# Patient Record
Sex: MALE | Race: WHITE | NOT HISPANIC OR LATINO | Employment: OTHER | URBAN - METROPOLITAN AREA
[De-identification: names, ages, dates, MRNs, and addresses within clinical notes are randomized per-mention and may not be internally consistent; named-entity substitution may affect disease eponyms.]

---

## 2019-11-01 ENCOUNTER — OFFICE VISIT (OUTPATIENT)
Dept: ORTHOPEDICS | Facility: CLINIC | Age: 39
End: 2019-11-01
Payer: COMMERCIAL

## 2019-11-01 ENCOUNTER — ANCILLARY PROCEDURE (OUTPATIENT)
Dept: GENERAL RADIOLOGY | Facility: CLINIC | Age: 39
End: 2019-11-01
Attending: FAMILY MEDICINE
Payer: COMMERCIAL

## 2019-11-01 VITALS — BODY MASS INDEX: 33.48 KG/M2 | DIASTOLIC BLOOD PRESSURE: 64 MMHG | SYSTOLIC BLOOD PRESSURE: 118 MMHG | WEIGHT: 252 LBS

## 2019-11-01 DIAGNOSIS — M25.812 SHOULDER IMPINGEMENT, LEFT: ICD-10-CM

## 2019-11-01 DIAGNOSIS — M25.512 ACUTE PAIN OF LEFT SHOULDER: Primary | ICD-10-CM

## 2019-11-01 DIAGNOSIS — M25.512 LEFT SHOULDER PAIN: ICD-10-CM

## 2019-11-01 PROCEDURE — 99204 OFFICE O/P NEW MOD 45 MIN: CPT | Performed by: FAMILY MEDICINE

## 2019-11-01 PROCEDURE — 73030 X-RAY EXAM OF SHOULDER: CPT | Mod: LT

## 2019-11-01 NOTE — PROGRESS NOTES
ASSESSMENT & PLAN  Mason was seen today for pain.    Diagnoses and all orders for this visit:    Acute pain of left shoulder  -     XR Shoulder Left G/E 3 Views; Future    Shoulder impingement, left      Patient is a 38 year old male presenting for evaluation of   Chief Complaint   Patient presents with     Left Shoulder - Pain      # Left Shoulder Rotator Cuff Tendinopathy/Impingement: Notable after a fall down stairs 2 weeks ago.  Pain notably over ant and lateral shoulder, no weakness on RTC testing.  No RTC tear on U/S.  XR neg for fracture.  Likely shoulder impingement in the setting of acute fall.  No radicular sx.  Counseled patient on nature of condition and treatment options.  Given this plan as below, follow-up as needed    Treatment: activities as tolerated  Physical Therapy HEP, if not improved can refer for formal PT  Injection Defer for now, can consider subacromial injection if pain does not improve  Medications  Limited NSAIDs/Tylenol    Concerning signs/sx that would warrant urgent evaluation were discussed.  All questions were answered, patient understands and agrees with plan.      Return if symptoms worsen or fail to improve.    -----    SUBJECTIVE  Masonbrock Sin is a/an 38 year old Right handed male who is seen as a self referral for evaluation of left shoulder pain. The patient is seen by themselves.    Onset: 2 week(s) ago. Patient describes injury as fell down stairs with acute pain immediately afterwards.  Pain improved can use with FROM.  Pt only has mild pain can have lingering pain afterwards.    Location of Pain: left anterior shoulder   Rating of Pain at worst: 1/10  Rating of Pain Currently: 0/10  Worsened by: reaching, abduction   Better with: rest   Treatments tried: no treatment tried to date  Associated symptoms: no distal numbness or tingling; denies swelling or warmth  Orthopedic history: NO  Relevant surgical history: NO  Past Medical History:   Diagnosis Date     NO ACTIVE  PROBLEMS      Social History     Socioeconomic History     Marital status: Single     Spouse name: Not on file     Number of children: Not on file     Years of education: Not on file     Highest education level: Not on file   Occupational History     Not on file   Social Needs     Financial resource strain: Not on file     Food insecurity:     Worry: Not on file     Inability: Not on file     Transportation needs:     Medical: Not on file     Non-medical: Not on file   Tobacco Use     Smoking status: Never Smoker     Smokeless tobacco: Never Used   Substance and Sexual Activity     Alcohol use: Yes     Comment: weekly     Drug use: No     Sexual activity: Yes     Partners: Female     Birth control/protection: Pill   Lifestyle     Physical activity:     Days per week: Not on file     Minutes per session: Not on file     Stress: Not on file   Relationships     Social connections:     Talks on phone: Not on file     Gets together: Not on file     Attends [employer] service: Not on file     Active member of club or organization: Not on file     Attends meetings of clubs or organizations: Not on file     Relationship status: Not on file     Intimate partner violence:     Fear of current or ex partner: Not on file     Emotionally abused: Not on file     Physically abused: Not on file     Forced sexual activity: Not on file   Other Topics Concern      Service No     Blood Transfusions No     Caffeine Concern No     Occupational Exposure No     Hobby Hazards Yes     Comment: Flying     Sleep Concern No     Stress Concern No     Weight Concern No     Special Diet No     Back Care No     Exercise Yes     Bike Helmet Yes     Seat Belt Yes     Self-Exams Yes     Parent/sibling w/ CABG, MI or angioplasty before 65F 55M? No   Social History Narrative     Not on file     Patient's past medical, surgical, social, and family histories were reviewed today and no changes are noted.  No family history pertinent to the patient's  problem today      REVIEW OF SYSTEMS:  10 point ROS is negative other than symptoms noted above in HPI, Past Medical History or as stated below  Constitutional: NEGATIVE for fever, chills, change in weight  Skin: NEGATIVE for worrisome rashes, moles or lesions  GI/: NEGATIVE for bowel or bladder changes  Neuro: NEGATIVE for weakness, dizziness or paresthesias    OBJECTIVE:  /64   Wt 114.3 kg (252 lb)   BMI 33.48 kg/m     General: healthy, alert and in no distress  HEENT: no scleral icterus or conjunctival erythema  Skin: no suspicious lesions or rash. No jaundice.  CV: regular rhythm by palpation  Resp: normal respiratory effort without conversational dyspnea   Psych: normal mood and affect  Gait: normal steady gait with appropriate coordination and balance  Neuro: normal light touch sensory exam of the bilateral upper extremities.    MSK:  LEFT SHOULDER  Inspection:    no swelling, bruising, discoloration, or obvious deformity or asymmetry  Palpation:    Tender about the greater tuberosity and supraspinatus insertion. Remainder of bony and tendinous landmarks are nontender.  Active Range of Motion:     Abduction 1800, FF 1800, , IR L1.    Strength:    Scapular plane abduction 5/5,  ER 5/5, IR 5/5, biceps 5/5, triceps 5/5  Special Tests:    Positive: Neer's, Frias' and [place]'s    Negative: supraspinatus (empty can), Speed's and Yergason's    CERVICAL SPINE  Inspection:    normal cervical lordosis present, rounded shoulders, forward head posture  Palpation:   Nontender.  Range of Motion:     Flexion full    Extension full    Right side bend full    Left side bend full    Right rotation full    Left rotation full  Strength:    Full strength throughout all neck muscles  Special Tests:    Positive: None    Negative: Spurling's (bilateral)    Independent visualization of the below image:  No results found for this or any previous visit (from the past 24 hour(s)).  LEFT SHOULDER THREE OR MORE VIEWS   11/1/2019 10:54 AM      HISTORY: Left shoulder pain.     COMPARISON: None.                                                                    IMPRESSION: No bony or soft tissue abnormality.     TANIKA LANDA MD  I  ordered, visualized and reviewed these images with the patient  No fracture     Patient's conditions were thoroughly discussed during today's visit with greater than 50% of the visit spent counseling the patient with total time spent face-to-face with the patient being 30 minutes.    Capo Diaz MD [facility]

## 2019-11-01 NOTE — PATIENT INSTRUCTIONS
Diagnosis: Left Shoulder pain following a fall likely strain of a muscle, shoulder impingement  Image Findings: Negative shoulder xr  Treatment: relative rest, home exercises  Job: no restrictions  Medications: Limited tylenol/ibuprofen for pain for 1-2 weeks  Follow-up: As needed    It was great seeing you today!    Capo Diaz

## 2020-02-10 ENCOUNTER — HEALTH MAINTENANCE LETTER (OUTPATIENT)
Age: 40
End: 2020-02-10

## 2020-11-16 ENCOUNTER — HEALTH MAINTENANCE LETTER (OUTPATIENT)
Age: 40
End: 2020-11-16

## 2021-04-03 ENCOUNTER — HEALTH MAINTENANCE LETTER (OUTPATIENT)
Age: 41
End: 2021-04-03

## 2021-09-18 ENCOUNTER — HEALTH MAINTENANCE LETTER (OUTPATIENT)
Age: 41
End: 2021-09-18

## 2021-11-08 ENCOUNTER — HOSPITAL ENCOUNTER (EMERGENCY)
Facility: CLINIC | Age: 41
Discharge: HOME | End: 2021-11-09
Attending: EMERGENCY MEDICINE | Admitting: EMERGENCY MEDICINE
Payer: COMMERCIAL

## 2021-11-08 DIAGNOSIS — Z72.820 SLEEP DEPRIVATION: ICD-10-CM

## 2021-11-08 DIAGNOSIS — F29 PSYCHOSIS, UNSPECIFIED PSYCHOSIS TYPE (H): ICD-10-CM

## 2021-11-08 DIAGNOSIS — F30.9 MANIA (H): ICD-10-CM

## 2021-11-08 PROCEDURE — 99285 EMERGENCY DEPT VISIT HI MDM: CPT | Performed by: EMERGENCY MEDICINE

## 2021-11-08 PROCEDURE — 99285 EMERGENCY DEPT VISIT HI MDM: CPT | Mod: 25 | Performed by: EMERGENCY MEDICINE

## 2021-11-08 PROCEDURE — C9803 HOPD COVID-19 SPEC COLLECT: HCPCS | Performed by: EMERGENCY MEDICINE

## 2021-11-08 ASSESSMENT — MIFFLIN-ST. JEOR: SCORE: 2112.4

## 2021-11-09 ENCOUNTER — APPOINTMENT (OUTPATIENT)
Dept: CT IMAGING | Facility: CLINIC | Age: 41
End: 2021-11-09
Attending: EMERGENCY MEDICINE
Payer: COMMERCIAL

## 2021-11-09 ENCOUNTER — TELEPHONE (OUTPATIENT)
Dept: BEHAVIORAL HEALTH | Facility: CLINIC | Age: 41
End: 2021-11-09

## 2021-11-09 VITALS
OXYGEN SATURATION: 97 % | TEMPERATURE: 97.9 F | BODY MASS INDEX: 33.41 KG/M2 | SYSTOLIC BLOOD PRESSURE: 131 MMHG | WEIGHT: 252.1 LBS | HEART RATE: 99 BPM | RESPIRATION RATE: 18 BRPM | DIASTOLIC BLOOD PRESSURE: 89 MMHG | HEIGHT: 73 IN

## 2021-11-09 LAB
ALBUMIN SERPL-MCNC: 3.9 G/DL (ref 3.4–5)
ALP SERPL-CCNC: 47 U/L (ref 40–150)
ALT SERPL W P-5'-P-CCNC: 26 U/L (ref 0–70)
ANION GAP SERPL CALCULATED.3IONS-SCNC: 6 MMOL/L (ref 3–14)
AST SERPL W P-5'-P-CCNC: 20 U/L (ref 0–45)
BASOPHILS # BLD AUTO: 0.1 10E3/UL (ref 0–0.2)
BASOPHILS NFR BLD AUTO: 1 %
BILIRUB SERPL-MCNC: 0.7 MG/DL (ref 0.2–1.3)
BUN SERPL-MCNC: 14 MG/DL (ref 7–30)
CALCIUM SERPL-MCNC: 8.9 MG/DL (ref 8.5–10.1)
CHLORIDE BLD-SCNC: 105 MMOL/L (ref 94–109)
CO2 SERPL-SCNC: 26 MMOL/L (ref 20–32)
CREAT SERPL-MCNC: 1.08 MG/DL (ref 0.66–1.25)
EOSINOPHIL # BLD AUTO: 0.6 10E3/UL (ref 0–0.7)
EOSINOPHIL NFR BLD AUTO: 8 %
ERYTHROCYTE [DISTWIDTH] IN BLOOD BY AUTOMATED COUNT: 12.3 % (ref 10–15)
GFR SERPL CREATININE-BSD FRML MDRD: 86 ML/MIN/1.73M2
GLUCOSE BLD-MCNC: 95 MG/DL (ref 70–99)
HCT VFR BLD AUTO: 48.2 % (ref 40–53)
HGB BLD-MCNC: 16.4 G/DL (ref 13.3–17.7)
IMM GRANULOCYTES # BLD: 0 10E3/UL
IMM GRANULOCYTES NFR BLD: 0 %
LYMPHOCYTES # BLD AUTO: 2.5 10E3/UL (ref 0.8–5.3)
LYMPHOCYTES NFR BLD AUTO: 34 %
MCH RBC QN AUTO: 28.9 PG (ref 26.5–33)
MCHC RBC AUTO-ENTMCNC: 34 G/DL (ref 31.5–36.5)
MCV RBC AUTO: 85 FL (ref 78–100)
MONOCYTES # BLD AUTO: 0.8 10E3/UL (ref 0–1.3)
MONOCYTES NFR BLD AUTO: 11 %
NEUTROPHILS # BLD AUTO: 3.5 10E3/UL (ref 1.6–8.3)
NEUTROPHILS NFR BLD AUTO: 46 %
NRBC # BLD AUTO: 0 10E3/UL
NRBC BLD AUTO-RTO: 0 /100
PLATELET # BLD AUTO: 293 10E3/UL (ref 150–450)
POTASSIUM BLD-SCNC: 4 MMOL/L (ref 3.4–5.3)
PROT SERPL-MCNC: 7.5 G/DL (ref 6.8–8.8)
RBC # BLD AUTO: 5.68 10E6/UL (ref 4.4–5.9)
SARS-COV-2 RNA RESP QL NAA+PROBE: NEGATIVE
SODIUM SERPL-SCNC: 137 MMOL/L (ref 133–144)
TSH SERPL DL<=0.005 MIU/L-ACNC: 1.37 MU/L (ref 0.4–4)
WBC # BLD AUTO: 7.4 10E3/UL (ref 4–11)

## 2021-11-09 PROCEDURE — U0005 INFEC AGEN DETEC AMPLI PROBE: HCPCS | Performed by: EMERGENCY MEDICINE

## 2021-11-09 PROCEDURE — 70450 CT HEAD/BRAIN W/O DYE: CPT

## 2021-11-09 PROCEDURE — 250N000013 HC RX MED GY IP 250 OP 250 PS 637: Performed by: EMERGENCY MEDICINE

## 2021-11-09 PROCEDURE — 84443 ASSAY THYROID STIM HORMONE: CPT | Performed by: EMERGENCY MEDICINE

## 2021-11-09 PROCEDURE — 82040 ASSAY OF SERUM ALBUMIN: CPT | Performed by: EMERGENCY MEDICINE

## 2021-11-09 PROCEDURE — 85025 COMPLETE CBC W/AUTO DIFF WBC: CPT | Performed by: EMERGENCY MEDICINE

## 2021-11-09 PROCEDURE — 36415 COLL VENOUS BLD VENIPUNCTURE: CPT | Performed by: EMERGENCY MEDICINE

## 2021-11-09 PROCEDURE — 90791 PSYCH DIAGNOSTIC EVALUATION: CPT

## 2021-11-09 RX ORDER — OLANZAPINE 5 MG/1
5 TABLET, ORALLY DISINTEGRATING ORAL ONCE
Status: COMPLETED | OUTPATIENT
Start: 2021-11-09 | End: 2021-11-09

## 2021-11-09 RX ADMIN — OLANZAPINE 5 MG: 5 TABLET, ORALLY DISINTEGRATING ORAL at 02:00

## 2021-11-09 NOTE — ED NOTES
Pt's wife Shanti called- wanting to know how he's doing. Glad he slept the night and did not want his nurse waking him up for her phone call.

## 2021-11-09 NOTE — ED PROVIDER NOTES
"ED Provider Note  [facility]      History     Chief Complaint   Patient presents with     Manic Behavior     reported has been having only few hours of sleep for 3-4 nights. Poor concentration and constanly talking for 72 hours.      The history is provided by the spouse.     Mason Sin is a 40 year old male who presents to the ED after recent manic episode.  Patient presents to the ED with his wife.  His wife reports that patient has not slept in the last 4 days.  In the last 72 hours, patient has been making no sense while talking and is been calling people he has not spoken to for a decade.  She denies that patient wishes to harm himself or others.  He states that patient has no mental health diagnosis and takes no mental health medications.  She states that this is the patient's first manic episode.  She states that patient has become paranoid about people disappearing at work and is worried about keeping the doors locked at their home.  He states that stressors include patient's job, wife's recent pregnancy, and 2-year-old child at home.  Patient's wife states that patient has not been exhibiting aggressive behavior, just has been talking nonstop.  She states that patient \"crashed\" today physically, patient was unable to move but he could still talk.  Patient fell asleep in the ED waiting room without the use of medication.  Patient's wife states that she is seeking prescription for sleep medication for the patient and to establish mental health care or therapy for the patient.  The DEC  states that patient has become alert, is calm, but is making odd comments, such as his \"need to go home to get on a rocket ship\". Pt additionally commenting about his great grandmother having a 6th sense. Denies any injuries or acute physical complaints at this time.     Past Medical History  Past Medical History:   Diagnosis Date     NO ACTIVE PROBLEMS      Past Surgical History: " "  Procedure Laterality Date     NO HISTORY OF SURGERY       valACYclovir (VALTREX) 1000 mg tablet  NO ACTIVE MEDICATIONS      No Known Allergies  Family History  History reviewed. No pertinent family history.  Social History   Social History     Tobacco Use     Smoking status: Never Smoker     Smokeless tobacco: Never Used   Substance Use Topics     Alcohol use: Yes     Comment: occ     Drug use: No      Past medical history, past surgical history, medications, allergies, family history, and social history were reviewed with the patient. No additional pertinent items.       Review of Systems   Unable to perform ROS: Mental status change     A complete review of systems was performed with pertinent positives and negatives noted in the HPI, and all other systems negative.    Physical Exam   BP: 118/86  Pulse: 74  Temp: 97.9  F (36.6  C)  Resp: 20  Height: 185.4 cm (6' 1\")  Weight: 114.4 kg (252 lb 1.6 oz)  SpO2: (!) 81 %  Physical Exam  Vitals and nursing note reviewed.   Constitutional:       General: He is not in acute distress.     Appearance: He is not diaphoretic.   HENT:      Head: Atraumatic.   Eyes:      General: No scleral icterus.  Cardiovascular:      Heart sounds: Normal heart sounds.   Pulmonary:      Effort: No respiratory distress.      Breath sounds: Normal breath sounds.   Abdominal:      Palpations: Abdomen is soft.      Tenderness: There is no abdominal tenderness.   Musculoskeletal:         General: No tenderness.   Skin:     General: Skin is warm.      Findings: No rash.   Neurological:      General: No focal deficit present.      Mental Status: He is oriented to person, place, and time.      Cranial Nerves: No cranial nerve deficit.      Sensory: No sensory deficit.      Motor: No weakness.      Coordination: Coordination normal.         ED Course     1:30 AM  The patient was seen and examined by Francy Alston MD in Room ED16A.     Procedures              Results for orders placed or performed " during the hospital encounter of 11/08/21   Head CT w/o contrast     Status: None    Narrative    EXAM: CT HEAD W/O CONTRAST  LOCATION: [facility]  DATE/TIME: 11/9/2021 3:26 AM    INDICATION: Psychosis  COMPARISON: None.  TECHNIQUE: Routine CT Head without IV contrast. Multiplanar reformats. Dose reduction techniques were used.    FINDINGS:  INTRACRANIAL CONTENTS: No intracranial hemorrhage, extraaxial collection, or mass effect.  No CT evidence of acute infarct. Normal parenchymal attenuation. Normal ventricles and sulci.     VISUALIZED ORBITS/SINUSES/MASTOIDS: No intraorbital abnormality. No paranasal sinus mucosal disease. No middle ear or mastoid effusion.    BONES/SOFT TISSUES: No acute abnormality.      Impression    IMPRESSION:  1.  Normal head CT.   Comprehensive metabolic panel     Status: Normal   Result Value Ref Range    Sodium 137 133 - 144 mmol/L    Potassium 4.0 3.4 - 5.3 mmol/L    Chloride 105 94 - 109 mmol/L    Carbon Dioxide (CO2) 26 20 - 32 mmol/L    Anion Gap 6 3 - 14 mmol/L    Urea Nitrogen 14 7 - 30 mg/dL    Creatinine 1.08 0.66 - 1.25 mg/dL    Calcium 8.9 8.5 - 10.1 mg/dL    Glucose 95 70 - 99 mg/dL    Alkaline Phosphatase 47 40 - 150 U/L    AST 20 0 - 45 U/L    ALT 26 0 - 70 U/L    Protein Total 7.5 6.8 - 8.8 g/dL    Albumin 3.9 3.4 - 5.0 g/dL    Bilirubin Total 0.7 0.2 - 1.3 mg/dL    GFR Estimate 86 >60 mL/min/1.73m2   CBC with platelets and differential     Status: None   Result Value Ref Range    WBC Count 7.4 4.0 - 11.0 10e3/uL    RBC Count 5.68 4.40 - 5.90 10e6/uL    Hemoglobin 16.4 13.3 - 17.7 g/dL    Hematocrit 48.2 40.0 - 53.0 %    MCV 85 78 - 100 fL    MCH 28.9 26.5 - 33.0 pg    MCHC 34.0 31.5 - 36.5 g/dL    RDW 12.3 10.0 - 15.0 %    Platelet Count 293 150 - 450 10e3/uL    % Neutrophils 46 %    % Lymphocytes 34 %    % Monocytes 11 %    % Eosinophils 8 %    % Basophils 1 %    % Immature Granulocytes 0 %    NRBCs per 100 WBC 0 <1 /100     Absolute Neutrophils 3.5 1.6 - 8.3 10e3/uL    Absolute Lymphocytes 2.5 0.8 - 5.3 10e3/uL    Absolute Monocytes 0.8 0.0 - 1.3 10e3/uL    Absolute Eosinophils 0.6 0.0 - 0.7 10e3/uL    Absolute Basophils 0.1 0.0 - 0.2 10e3/uL    Absolute Immature Granulocytes 0.0 <=0.0 10e3/uL    Absolute NRBCs 0.0 10e3/uL   Asymptomatic COVID-19 Virus (Coronavirus) by PCR Oropharynx     Status: Normal    Specimen: Oropharynx; Swab   Result Value Ref Range    SARS CoV2 PCR Negative Negative    Narrative    Testing was performed using the [employer] Xpress SARS-CoV-2 Assay on the  Cepheid Gene-Xpert Instrument Systems. Additional information about  this Emergency Use Authorization (EUA) assay can be found via the Lab  Guide. This test should be ordered for the detection of SARS-CoV-2 in  individuals who meet SARS-CoV-2 clinical and/or epidemiological  criteria. Test performance is unknown in asymptomatic patients. This  test is for in vitro diagnostic use under the FDA EUA for  laboratories certified under CLIA to perform high complexity testing.  This test has not been FDA cleared or approved. A negative result  does not rule out the presence of PCR inhibitors in the specimen or  target RNA in concentration below the limit of detection for the  assay. The possibility of a false negative should be considered if  the patient's recent exposure or clinical presentation suggests  COVID-19. This test was validated by the [facility]. This laboratory is certified under  the Clinical Laboratory Improvement Amendments of 1988 (CLIA-88) as  qualified to perform high complexity laboratory testing.     TSH with free T4 reflex     Status: Normal   Result Value Ref Range    TSH 1.37 0.40 - 4.00 mU/L   CBC with platelets differential     Status: None    Narrative    The following orders were created for panel order CBC with platelets differential.  Procedure                               Abnormality          Status                     ---------                               -----------         ------                     CBC with platelets and d...[[id number]]                      Final result                 Please view results for these tests on the individual orders.     Medications   OLANZapine zydis (zyPREXA) ODT tab 5 mg (5 mg Oral Given 11/9/21 0200)        Assessments & Plan (with Medical Decision Making)   Patient making odd comments, at times speaking gibberish.  Is unclear whether gavi has caused sleeplessness as well as psychosis, versus insomnia has led to gavi and psychosis.  Given that this is 1st episode of psychosis, wife states he does not use drugs and patient confirms this, we did do some medical evaluation including labs, TSH, head CT, all of which were unremarkable.  Neuro exam is unremarkable.  We did give 5 mg of Zyprexa overnight to try to help him sleep.  We will reassess in the morning.  If he seems more coherent at that time he can likely be discharged with outpatient follow-up.  He will be signed out to the oncoming provider at shift change pending repeat B[facility] evaluation.     Dictation Disclaimer: Some of this Note has been completed with voice-recognition dictation software. Although errors are generally corrected real-time, there is the potential for a rare error to be present in the completed chart.      I have reviewed the nursing notes. I have reviewed the findings, diagnosis, plan and need for follow up with the patient.    New Prescriptions    No medications on file       Final diagnoses:   Gavi (H)   Sleep deprivation   Psychosis, unspecified psychosis type (H)     I, Rito Cordon, am serving as a trained medical scribe to document services personally performed by Francy Alston MD, based on the provider's statements to me.      Francy HICKS MD, was physically present and have reviewed and verified the accuracy of this note documented by Rito Cordon.   --  Francy  Elisa KLINE [facility] EMERGENCY DEPARTMENT  11/8/2021     Francy Alston MD  11/09/21 0620

## 2021-11-09 NOTE — ED PROVIDER NOTES
"[facility] ED Mental Health Handoff Note:       Brief HPI:  This is a 40 year old male signed out to me by Dr. Alston.  See initial ED Provider note for full details of the presentation.     Home meds reviewed and ordered/administered: No    Medically stable for inpatient mental health admission: Yes.    Evaluated by mental health: Yes. The recommendation is for outpatient mental health treatment. Resources and plan given to patient.    Safety concerns: At the time I received sign out, there were no safety concerns.    Hold Status:  Active Orders   N/A            Exam:   Patient Vitals for the past 24 hrs:   BP Temp Temp src Pulse Resp SpO2 Height Weight   11/09/21 0920 (!) 128/91 -- -- 77 20 100 % -- --   11/09/21 0129 -- -- -- -- -- 96 % -- --   11/08/21 2240 118/86 97.9  F (36.6  C) Oral 74 20 (!) 81 % 1.854 m (6' 1\") 114.4 kg (252 lb 1.6 oz)             ED Course:    Medications   OLANZapine zydis (zyPREXA) ODT tab 5 mg (5 mg Oral Given 11/9/21 0200)            There were no significant events during my shift.          Impression:    ICD-10-CM    1. Gavi (H)  F30.9    2. Sleep deprivation  Z72.820    3. Psychosis, unspecified psychosis type (H)  F29        Plan:    1. Discharged.     I spoke with the patient prior to his discharge.  He denied suicidal or homicidal ideation.  He did mid to that he was having manic symptoms.  He had a plan to follow-up with the scheduled outpatient mental health resources provided by the mental health .      RESULTS:   Results for orders placed or performed during the hospital encounter of 11/08/21 (from the past 24 hour(s))   CBC with platelets differential     Status: None    Collection Time: 11/09/21  2:30 AM    Narrative    The following orders were created for panel order CBC with platelets differential.  Procedure                               Abnormality         Status                     ---------                               -----------         ------    "                  CBC with platelets and d...[[id number]]                      Final result                 Please view results for these tests on the individual orders.   Comprehensive metabolic panel     Status: Normal    Collection Time: 11/09/21  2:30 AM   Result Value Ref Range    Sodium 137 133 - 144 mmol/L    Potassium 4.0 3.4 - 5.3 mmol/L    Chloride 105 94 - 109 mmol/L    Carbon Dioxide (CO2) 26 20 - 32 mmol/L    Anion Gap 6 3 - 14 mmol/L    Urea Nitrogen 14 7 - 30 mg/dL    Creatinine 1.08 0.66 - 1.25 mg/dL    Calcium 8.9 8.5 - 10.1 mg/dL    Glucose 95 70 - 99 mg/dL    Alkaline Phosphatase 47 40 - 150 U/L    AST 20 0 - 45 U/L    ALT 26 0 - 70 U/L    Protein Total 7.5 6.8 - 8.8 g/dL    Albumin 3.9 3.4 - 5.0 g/dL    Bilirubin Total 0.7 0.2 - 1.3 mg/dL    GFR Estimate 86 >60 mL/min/1.73m2   CBC with platelets and differential     Status: None    Collection Time: 11/09/21  2:30 AM   Result Value Ref Range    WBC Count 7.4 4.0 - 11.0 10e3/uL    RBC Count 5.68 4.40 - 5.90 10e6/uL    Hemoglobin 16.4 13.3 - 17.7 g/dL    Hematocrit 48.2 40.0 - 53.0 %    MCV 85 78 - 100 fL    MCH 28.9 26.5 - 33.0 pg    MCHC 34.0 31.5 - 36.5 g/dL    RDW 12.3 10.0 - 15.0 %    Platelet Count 293 150 - 450 10e3/uL    % Neutrophils 46 %    % Lymphocytes 34 %    % Monocytes 11 %    % Eosinophils 8 %    % Basophils 1 %    % Immature Granulocytes 0 %    NRBCs per 100 WBC 0 <1 /100    Absolute Neutrophils 3.5 1.6 - 8.3 10e3/uL    Absolute Lymphocytes 2.5 0.8 - 5.3 10e3/uL    Absolute Monocytes 0.8 0.0 - 1.3 10e3/uL    Absolute Eosinophils 0.6 0.0 - 0.7 10e3/uL    Absolute Basophils 0.1 0.0 - 0.2 10e3/uL    Absolute Immature Granulocytes 0.0 <=0.0 10e3/uL    Absolute NRBCs 0.0 10e3/uL   TSH with free T4 reflex     Status: Normal    Collection Time: 11/09/21  2:30 AM   Result Value Ref Range    TSH 1.37 0.40 - 4.00 mU/L   Asymptomatic COVID-19 Virus (Coronavirus) by PCR Oropharynx     Status: Normal    Collection Time: 11/09/21  2:42 AM     Specimen: Oropharynx; Swab   Result Value Ref Range    SARS CoV2 PCR Negative Negative    Narrative    Testing was performed using the Xpert Xpress SARS-CoV-2 Assay on the  Cepheid Gene-Xpert Instrument Systems. Additional information about  this Emergency Use Authorization (EUA) assay can be found via the Lab  Guide. This test should be ordered for the detection of SARS-CoV-2 in  individuals who meet SARS-CoV-2 clinical and/or epidemiological  criteria. Test performance is unknown in asymptomatic patients. This  test is for in vitro diagnostic use under the FDA EUA for  laboratories certified under CLIA to perform high complexity testing.  This test has not been FDA cleared or approved. A negative result  does not rule out the presence of PCR inhibitors in the specimen or  target RNA in concentration below the limit of detection for the  assay. The possibility of a false negative should be considered if  the patient's recent exposure or clinical presentation suggests  COVID-19. This test was validated by the [facility]. This laboratory is certified under  the Clinical Laboratory Improvement Amendments of 1988 (CLIA-88) as  qualified to perform high complexity laboratory testing.     Head CT w/o contrast     Status: None    Collection Time: 11/09/21  3:33 AM    Narrative    EXAM: CT HEAD W/O CONTRAST  LOCATION: [facility]  DATE/TIME: 11/9/2021 3:26 AM    INDICATION: Psychosis  COMPARISON: None.  TECHNIQUE: Routine CT Head without IV contrast. Multiplanar reformats. Dose reduction techniques were used.    FINDINGS:  INTRACRANIAL CONTENTS: No intracranial hemorrhage, extraaxial collection, or mass effect.  No CT evidence of acute infarct. Normal parenchymal attenuation. Normal ventricles and sulci.     VISUALIZED ORBITS/SINUSES/MASTOIDS: No intraorbital abnormality. No paranasal sinus mucosal disease. No middle ear or  mastoid effusion.    BONES/SOFT TISSUES: No acute abnormality.      Impression    IMPRESSION:  1.  Normal head CT.             MD Zachary Tolentino Jill C, MD  11/09/21 [id number]

## 2021-11-09 NOTE — DISCHARGE INSTRUCTIONS
Mental Health Concerns    You were seen today for mental health concerns, such as depression, severe anxiety, or suicidal thinking. Your doctor feels that you do not require hospitalization at this time. However, your symptoms may become worse, and you may need to return to the Emergency Department. Most treatments of depression and suicidal thoughts are a process rather than a single intervention.  Medications and counseling can take several weeks or more to help.  It is important to follow up as discussed with your family doctor or counselor.      If you are getting worse, you can contact a friend or a family member, contact your counselor or family doctor, contact a crisis line, or other options discussed with the doctor or therapist today.  At any time, you can call 911 and return to the emergency department for more help.    How to improve your mental health and prevent suicide:  Involve others by letting family, friends, counselors know.  Do not isolate yourself.  Avoid alcohol or drugs. Remove weapons, poisons from your home.  Try to stick to routines for eating, sleeping and getting regular exercise.    Try to get into sunlight. Bright natural light not only treats seasonal affective disorder but also depression.  Increase safe activities that you enjoy.    If you feel worse, contact [phone], or call 911, or your family doctor/counselor for additional assistance.      Remember that you can always come back to the Emergency Department if you are not able to see your regular doctor in the amount of time listed above, if you get any new symptoms, or if there is anything that worries you.

## 2021-11-09 NOTE — TELEPHONE ENCOUNTER
First attempt at reaching patient. Left message for the patient with information on the TC and asked for a return call to schedule

## 2021-11-09 NOTE — TELEPHONE ENCOUNTER
Received phone call from patient. Scheduled appointment with [facility] on 11/10/2021 at 8:30am.

## 2021-11-09 NOTE — ED NOTES
"11/8/2021  Mason Sin 1980     [facility] Crisis Assessment:    Started at: 11:55pm  Completed at: 12:30am    Patient was assessed via in-person.  Patient Location: [facility] ED    Chief Complaint and History of Presenting Problem:    Patient is a 40 year old  male who presented to the ED by Family/Friends related to concerns for a manic episode.     Collateral information from wife, Shanti:    For the last four days, patient has been restless, hyperverbal, and not sleeping. Pt has not been eating adequately, making reactive decisions and behaviors. In the last 72-hours, he has been \"non-stop talking, calling people he hasn't talked to in decades.\" Patient's \"lindy peaked today\", and he made paranoid comments at 3pm. He made comments about people from his job disappearing and telling her to make sure the doors are locked excessively. He has not endorsed hallucinations, except for when he told her that his late grandmother woke him up this morning. He has not made any comments about hurting himself or harming others. He has not behaved aggressively towards her, their 2-year old child, or other family members. At 6pm, pt \"collapsed\" at home, told family that he couldn't move his body, but was still talking incoherently. After a family intervention, pt agreed to come to the ED, but multiple people needed to assist. Pt \"passed out\" in the waiting room, sleeping for the first time in four days. She states that he has no MH diagnoses, not prescribed any medications, and never had a history of manic episodes.     At the time of assessment, patient was sleeping in a recliner chair. It was not difficult for wife to wake up patient. Pt was calm in mood but made tangential and incoherent comments. Pt reports feeling \"exhausted\" and wanting to sleep more so that he could get to his rocket ship. Pt denied SI, SIB, and HI.        Assessment and intervention involved meeting with pt, obtaining collateral from [facility] and Care " Everywhere records and wife, Shanti, employing crisis psychotherapy including: Establishing rapport, Active listening, Assess dimensions of crisis, Establish a discharge plan, Brief Supportive Therapy and Safety planning.     Biopsychosocial Background and Demographic Information    Patient lives in [place], [place] with his wife, Shanti, and their 2-year old child. Wife is 7-months pregnant. Pt currently works as a tech manager. He works from home and started this position 6 months ago. He is in the process of starting a business with his wife. He is a licensed  and enjoys flying as a hobby.     Mental Health History and Current Symptoms     Patient denies previous mental health diagnoses, prescriptions for psychotropic medications, or psychiatric hospitalizations.. Per [facility] and Care Everywhere, pt has not been seen for MH concerns in the past. Patient currently presents with grossly disorganized and pressured speech, flight of ideas, delusions about rocket ships and his  grandmother, paranoia and decreased need for sleep. Per pt's wife, he has not behaved this way prior to four days ago.     Mental Health History (prior psychiatric hospitalizations, civil commitments, programmatic care, etc): Denies  Family Mental and Chemical Health History: Denies, Wife believes pt's brother and uncle may have undiagnosed conditions    Current and Historic Psychotropic Medications: None  Medication Adherent: N/A  Recent medication changes? No    Relevant Medical Concerns  Patient identifies concerns with completing ADLs? No  Patient can ambulate independently? Yes  Other medical health concerns? No  History of concussion or TBI? No     Trauma History   Physical, Emotional, or Sexual abuse: No  Loss of a friend or family member to suicide: No  Other identified traumatic event or significant stressor: No    Substance Use History and Treatments  Patient denies.    Drug screen/BAL/Breathalyzer Completed? pending  "collection  Results: pending    History of Suicidal Ideation, Suicide Attempts, Non-Suicidal Self Injury, and Risk Formulation:   Details of Current Ideation, Attempt(s), Plan(s): Denies   Risk factors:  impulsivity/recklessness.   Protective factors:  strong bond to family/friends, employment, responsibilities to others (spouse, pets, children, etc.), identification of future goals and future oriented towards goals, hopes, dreams.  History and Prior Methods of Self-injury: Denies  History of Suicide Attempts: Denies    ESS-6  1.a. Over the past 2 weeks, have you had thoughts of killing yourself? No   1.b. Have you ever attempted to kill yourself and, if yes, when did this last happen? No  2. Recent or current suicide plan? No  3. Recent or current intent to act on ideation? No  4. Lifetime psychiatric hospitalization? No  5. Pattern of excessive substance use? No  6. Current irritability, agitation, or aggression? No  ESS-6 Score: 0      Other Risk Areas  Aggressive/assumptive/homicidal risk factors: No   Sexually inappropriate behavior? No      Vulnerability to sexual exploitation? No     Clinical Presentation and Current Symptoms   Patient currently presents with grossly disorganized, hyperverbal, and pressured speech, flight of ideas, delusions, paranoia and decreased need for sleep. Per pt's wife, he has not behaved this way prior to four days ago. He is physically calm and appears fatigued. Patient denies thoughts of harm self or others. Patient reports wanting to \"sleep it off.\"       Attention, Hyperactivity, and Impulsivity: No   Anxiety:No    Behavioral Difficulties: Yes: Impulsivity/Disinhibition   Mood Symptoms: Yes: Flight of ideas, Impaired concentration, Impaired decision making , Gavi and Sleep disturbance    Appetite: Yes: Loss of Appetite   Feeding and Eating: No  Interpersonal Functioning: No  Learning Disabilities/Cognitive/Developmental Disorders: No   General Cognitive Impairments: Yes: " "Decision-Making and Judgment/Insight  If yes, see completed Mini-Cog Assessment below.  Sleep: Yes: Difficulty falling asleep  and Difficulty staying sleep    Psychosis: Yes: Paranoia and Grossly Disorganized Speech    Trauma: No       Mental Status Exam:  Affect: Constricted  Appearance: Disheveled   Attention Span/Concentration: Inattentive    Eye Contact: Variable  Fund of Knowledge: Appropriate   Language /Speech Content: Fluent  Language /Speech Volume: Normal   Language /Speech Rate/Productions: Hyperverbal and Pressured   Recent Memory: Intact  Remote Memory: Intact  Mood: Anxious and Euphoric   Orientation:   Person: Yes   Place: Yes  Time of Day: Yes   Date: Yes   Situation (Do they understand why they are here?): Yes    Psychomotor Behavior: Normal   Thought Content: Delusions and Paranoia  Thought Form: Flight of Ideas, Loose Associations and Tangential        Current Providers and Contact Information   Patient is his own legal guardian.     Primary Care Provider: No  Psychiatrist: No  Therapist: No  : No  CTSS or ARMHS: No  ACT Team: No  Other: No    Has an MAYRA been signed? No ;     Clinical Summary and Recommendations    Clinical summary of assessment (include strengths, protective factors, community resources, and assessment of vulnerability/risk):     Patient is a 40 year old other wise healthy male with not previous history of mental health concerns. Pt's wife reports that he does not have a primary care provider as he \"never gets sick.\" She states that she feels safe with him at home with their 2-year old child. Pt denies SI and HI. Pt's wife states that he has not made suicidal or homicidal comments. Pt has support both from his family and his wife's family. Pt's wife would like for him to see a therapist to address his current stressors, which pt agrees to. Based on pt's fatigue and disorganized speech, it is recommended that pt spend the night in the ED for observation. "       Diagnosis with F Codes:      Adjustment disorder with other symptoms F43.29    Disposition  Attending provider, Dr. Alston  consulted and does  agree with recommended disposition which includes Other: Overnight stay at the ED for observation. Patient agrees with recommended level of care.      Details of final disposition include: Observation in ED with pending discharge    If Discharging, what are follow up needs?     If I am feeling unsafe or I am in a crisis, I will:  Contact my established care providers   Call the National Suicide Prevention Lifeline: [phone]   Go to the nearest emergency room   Call 459       Warning signs that I or other people might notice when a crisis is developing for me: Not sleeping, overworking    Things I am able to do on my own to cope or help me feel better: Rest, take breaks, eat balanced meals    Things that I am able to do with others to cope or help me better: Take time off from work, spend time with family    Things I can use or do for distraction: Spend time with family    Changes I can make to support my mental health and wellness: Connect with therapist and primary care provider    People in my life that I can ask for help: Wife, parents, brothers    Your [facility] has a mental health crisis team you can call 24/7: [facility] Crisis Line Number: [phone]          Safety/after care plan provided to Patient by RN    Duration of assessment time: .50 hrs    CPT code(s) utilized: 90522, up to 74 minutes      Bradley Sawyer

## 2021-11-09 NOTE — ED NOTES
If I am feeling unsafe or I am in a crisis, I will:  Contact my established care providers   Call the National Suicide Prevention Lifeline: [phone]   Go to the nearest emergency room   Call 169          Warning signs that I or other people might notice when a crisis is developing for me: Not sleeping, overworking    Things I am able to do on my own to cope or help me feel better: Rest, take breaks, eat balanced meals    Things that I am able to do with others to cope or help me better: Take time off from work, spend time with family    Things I can use or do for distraction: Spend time with family    Changes I can make to support my mental health and wellness: Connect with therapist and primary care provider    People in my life that I can ask for help: Wife, parents, brothers    Your [facility] has a mental health crisis team you can call 24/7: [facility] Crisis Line Number: [phone]

## 2021-11-09 NOTE — ED NOTES
Brief Reassessment      Mason Sin is reassessed for change in status and disposition needs. Pt was seen on 11/9/2021 at 7am by Akanksha Colbert; see the initial assessment note for details.    Current patient presentation: Pt was sleepy but engaged in re-assessment. He noted feeling better and needing more sleep. No overt signs of disorganization observed.    Current risk to self or others? No    ESS-6  1.a. Over the past 2 weeks, have you had thoughts of killing yourself? No   1.b. Have you ever attempted to kill yourself and, if yes, when did this last happen? No  2. Recent or current suicide plan? No  3. Recent or current intent to act on ideation? No  4. Lifetime psychiatric hospitalization? No  5. Pattern of excessive substance use? No  6. Current irritability, agitation, or aggression? No  ESS-6 Score: 0     Changes in Mental Status: Yes; please explain: Pt seems less disorganized than reported in initial assessment    Appearance:   Appropriate   Eye Contact:   Poor  Psychomotor Behavior: Normal  Retarded (Slowed)   Attitude:   Cooperative   Orientation:   All  Speech   Rate / Production: Normal/ Responsive Normal    Volume:  Normal   Mood:    Normal  Affect:    Appropriate  Flat   Thought Content:  Clear   Thought Form:  Coherent  Logical   Insight:    Good  and Poor     Additional collateral information: NA    Changes noted since prior assessment: Yes - describe coherent    Treatment Objectives and Progress addressed while boarding in the ED: NA    Therapeutic Interventions: NA    Clinical Impressions: Pt was calm, engaged, tired. Noted wanting to sleep more. Open to OP MH referrals. Seemed to believe symptoms brought on by trying to do too much and working too hard.    Disposition Recommendations and Rationale: Initial therapy session scheduled for 12/8/2021. Referral made to [facility]    Reviewed assessment with attending provider:     Time spent: 90 minutes      Akanksha Colbert  LICSW

## 2021-11-10 ENCOUNTER — VIRTUAL VISIT (OUTPATIENT)
Dept: BEHAVIORAL HEALTH | Facility: CLINIC | Age: 41
End: 2021-11-10
Payer: COMMERCIAL

## 2021-11-10 DIAGNOSIS — F43.29 ADJUSTMENT DISORDER WITH MIXED EMOTIONAL FEATURES: Primary | ICD-10-CM

## 2021-11-10 LAB — HOLD SPECIMEN: NORMAL

## 2021-11-10 ASSESSMENT — ANXIETY QUESTIONNAIRES
GAD7 TOTAL SCORE: 4
7. FEELING AFRAID AS IF SOMETHING AWFUL MIGHT HAPPEN: NOT AT ALL
1. FEELING NERVOUS, ANXIOUS, OR ON EDGE: SEVERAL DAYS
2. NOT BEING ABLE TO STOP OR CONTROL WORRYING: SEVERAL DAYS
5. BEING SO RESTLESS THAT IT IS HARD TO SIT STILL: SEVERAL DAYS
GAD7 TOTAL SCORE: 4
4. TROUBLE RELAXING: SEVERAL DAYS
6. BECOMING EASILY ANNOYED OR IRRITABLE: NOT AT ALL
3. WORRYING TOO MUCH ABOUT DIFFERENT THINGS: NOT AT ALL
8. IF YOU CHECKED OFF ANY PROBLEMS, HOW DIFFICULT HAVE THESE MADE IT FOR YOU TO DO YOUR WORK, TAKE CARE OF THINGS AT HOME, OR GET ALONG WITH OTHER PEOPLE?: SOMEWHAT DIFFICULT
7. FEELING AFRAID AS IF SOMETHING AWFUL MIGHT HAPPEN: NOT AT ALL
GAD7 TOTAL SCORE: 4

## 2021-11-10 ASSESSMENT — PATIENT HEALTH QUESTIONNAIRE - PHQ9
10. IF YOU CHECKED OFF ANY PROBLEMS, HOW DIFFICULT HAVE THESE PROBLEMS MADE IT FOR YOU TO DO YOUR WORK, TAKE CARE OF THINGS AT HOME, OR GET ALONG WITH OTHER PEOPLE: SOMEWHAT DIFFICULT
SUM OF ALL RESPONSES TO PHQ QUESTIONS 1-9: 3
SUM OF ALL RESPONSES TO PHQ QUESTIONS 1-9: 3

## 2021-11-10 NOTE — PROGRESS NOTES
"               Progress Note - Initial Visit    Client Name:  Mason Sin Date: 11/10/2021         Service Type: Individual     Visit Start Time: 830  Visit End Time: 954    Visit #: 1    Attendees: Client and Spouse / Significant Other    Service Modality:  Video Visit:      Provider verified identity through the following two step process.  Patient provided:  Patient  and Patient address    Telemedicine Visit: The patient's condition can be safely assessed and treated via synchronous audio and visual telemedicine encounter.      Reason for Telemedicine Visit: Services only offered telehealth    Originating Site (Patient Location): Patient's home    Distant Site (Provider Location): [facility] MENTAL HEALTH & ADDICTION SERVICES    Consent:  The patient/guardian has verbally consented to: the potential risks and benefits of telemedicine (video visit) versus in person care; bill my insurance or make self-payment for services provided; and responsibility for payment of non-covered services.     Patient would like the video invitation sent by:  Send to e-mail at: [email or web address]    Mode of Communication:  Video Conference via Amwell    As the provider I attest to compliance with applicable laws and regulations related to telemedicine.       DATA:   Interactive Complexity: No   Crisis: No     Presenting Concerns/  Current Stressors:  Patient referred to the Transition Clinic by [facility] DEC  on 2021. Patient presented to ED on 2021 with his wife after recent manic episode. Per chart review, the patient had not been sleeping for approximately 4 days, had been talking nonsensically, experiencing paranoia about people disappearing at work and had been worried about his family and keeping their doors locked at their home. Per chart,  \"stressors include patient's job, wife's recent pregnancy, and 2-year-old child at home.  Patient's wife states that patient has not been exhibiting " "aggressive behavior, just has been talking nonstop. She states that patient \"crashed\" today physically, patient was unable to move but he could still talk.  Patient fell asleep in the ED waiting room without the use of medication.  Patient's wife states that she is seeking prescription for sleep medication for the patient and to establish mental health care or therapy for the patient.  The DEC  states that patient has become alert, is calm, but is making odd comments, such as his \"need to go home to get on a rocket ship\". Pt additionally commenting about his great grandmother having a 6th sense. Denies any injuries or acute physical complaints at this time.\" Provider in ED notes, \"Patient making odd comments, at times speaking gibberish.  Is unclear whether lindy has caused sleeplessness as well as psychosis, versus insomnia has led to lindy and psychosis.  Given that this is 1st episode of psychosis, wife states he does not use drugs and patient confirms this, we did do some medical evaluation including labs, TSH, head CT, all of which were unremarkable.  Neuro exam is unremarkable.  We did give 5 mg of Zyprexa overnight to try to help him sleep.  We will reassess in the morning.  If he seems more coherent at that time he can likely be discharged with outpatient follow-up.\"    Patient was discharged to home with outpatient therapy set up with [facility], start date of 12/08/2021. Patient is not currently taking any mental health medications.     Today, patient was joined in the visit by his wife Shanti. Also present in the room but not engaged in the session, were the patients parents. Patient gave consent for his wife and his parents to be present for the visit. Patient was hyperverbal during the session, tangential, and difficult to track at times. The patient states he is \"so much better than I was\" but the patients wife states that he is pretty much the same as when he first started to " "present with these symptoms 4-5 days ago. This appeared to frustrate the patient when his wife added this to the conversation and he replied, \"you didn't see the worst of it.\" The patient offered details regarding his employment and how much his salary is and made statements about what he expected to make this year and next year and that he and his wife have \"hit the lottery\". 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 However, he now realizes that it was more his dream than his wife's and he now knows that his wife would be happy if they were living out of a tent and that money and \"things\" are something she finds of utmost importance. The patient jumped from topic to topic throughout the 84 minute session and it was difficult at times to redirect him. The patient became frustrated when his wife suggested he had been paranoid and he stated that it wasn't paranoia, \"it was worry.\" The patient told this clinician that he doesn't tell his wife everything about how scary this type of life can be (referring to having a lot of wealth and being in a high profile job) and he has seen people in his type of position be threatened physically and be stolen from.     Much of the session was spent listening to the patient, validating his feelings of being overwhelmed and discussing goals of therapy. Patient will be seen by this clinician 1x/week until he starts with his long-term therapist on 12/8/21.         ASSESSMENT:  Mental Status Assessment:  Appearance:   Appropriate   Eye Contact:   Good   Psychomotor Behavior: Hyperactive  Restless   Attitude:   Cooperative  Friendly  Orientation:   All  Speech   Rate / Production: Hyperverbal  Pressured    Volume:  Normal   Mood:    Elevated  Hypomanic  easily agitated when wife would offer perspective  Affect:    Labile   Thought Content:  Paranoia  Perservative  at " times exhibited some egomania   Thought Form:  Tangential   Insight:    Poor       Safety Issues and Plan for Safety and Risk Management:     [place] Suicide Severity Rating Scale (Short Version)  [place] Suicide Severity Rating (Short Version) 11/8/2021   Over the past 2 weeks have you felt down, depressed, or hopeless? no   Over the past 2 weeks have you had thoughts of killing yourself? no   Have you ever attempted to kill yourself? no     Patient denies current fears or concerns for personal safety.  Patient denies current or recent suicidal ideation or behaviors.  Patient denies current or recent homicidal ideation or behaviors.  Patient denies current or recent self injurious behavior or ideation.  Patient denies other safety concerns.  Recommended that patient call 911 or go to the local ED should there be a change in any of these risk factors.  Patient reports there are no firearms in the house.     Diagnostic Criteria:  A. The development of emotional or behavioral symptoms in response to an identifiable stressor(s) occurring within 3 months of the onset of the stressor(s)  B. These symptoms or behaviors are clinically significant, as evidenced by one or both of the following:       - Marked distress that is out of proportion to the severity/intensity of the stressor (with consideration for external context & culture)  C. The stress-related disturbance does not meet criteria for another disorder & is not not an exacerbation of another mental disorder  D. The symptoms do not represent normal bereavement  E. Once the stressor or its consequences have terminated, the symptoms do not persist for more than an additional 6 months       * Adjustment Disorder with Anxiety: The predominant manfestations are symptoms such as nervousness, worry, or jitteriness, or, in children separation anxiety from major attachment figures      DSM5 Diagnoses: (Sustained by DSM5 Criteria Listed Above)  Diagnoses: Adjustment  "Disorders  309.9 (F43.20) Unspecified  Psychosocial & Contextual Factors: Recently bought a cafe/bakery; wife is 7 months pregnant; high stress/high expectations job  WHODAS 2.0 (12 item): patient stated he had started to complete assessment but joined session before completing. Will follow up with this at next session.  Intervention:   Mindfulness- Patient was educated on relaxation and mindfulness techniques, Educated on Sleep Hygiene- regular sleep patterns, eliminating electronics prior to bed, relaxation techniques, and Educated on treatment planning and started identifying goals and interventions for treatment plan  Collateral Reports Completed:  Communicated with: the patients wife Shanti who indicated that she feels as if the patients sx have been \"building\" for the past two months and peaked this past weekend.      PLAN: (Homework, other):  1. Provider will continue Diagnostic Assessment.  Patient was given the following to do until next session:  1) go on a daily walk to help calm mind and focus on plan for day; 2) use diversion activities to help \"shut his mind off\" (listening to music, taking a walk, flying his plane).    2. Provider recommended the following referrals: No formal referrals were made but the patient requested information on mental health clinics that provide formal ADHD testing. This information was sent to the patient.       3.  Safety plan that was created in the ED was reviewed with the patient.       Nicky Lopez [facility]  November 10, 2021                      Answers for HPI/ROS submitted by the patient on 11/10/2021  If you checked off any problems, how difficult have these problems made it for you to do your work, take care of things at home, or get along with other people?: Somewhat difficult  PHQ9 TOTAL SCORE: 3  MARKO 7 TOTAL SCORE: 4      "

## 2021-11-11 ASSESSMENT — ANXIETY QUESTIONNAIRES: GAD7 TOTAL SCORE: 4

## 2021-11-11 ASSESSMENT — PATIENT HEALTH QUESTIONNAIRE - PHQ9: SUM OF ALL RESPONSES TO PHQ QUESTIONS 1-9: 3

## 2021-11-15 ENCOUNTER — VIRTUAL VISIT (OUTPATIENT)
Dept: BEHAVIORAL HEALTH | Facility: CLINIC | Age: 41
End: 2021-11-15
Payer: COMMERCIAL

## 2021-11-15 DIAGNOSIS — F43.20 ADJUSTMENT DISORDER, UNSPECIFIED TYPE: Primary | ICD-10-CM

## 2021-11-15 NOTE — PROGRESS NOTES
Progress Note-Transition Clinic    Patient Name: Mason Sin  Date: 11/15/2021         Service Type: Individual      Session Start Time: 10:00 a.m.  Session End Time: 10:58 a.m.     Session Length: 58 minutes    Session #: 2    Attendees: Client attended alone    Service Modality:  Video Visit:      Provider verified identity through the following two step process.  Patient provided:  Patient is known previously to provider    Telemedicine Visit: The patient's condition can be safely assessed and treated via synchronous audio and visual telemedicine encounter.      Reason for Telemedicine Visit: Services only offered telehealth    Originating Site (Patient Location): Patient's home    Distant Site (Provider Location): [facility] MENTAL HEALTH & ADDICTION SERVICES    Consent:  The patient/guardian has verbally consented to: the potential risks and benefits of telemedicine (video visit) versus in person care; bill my insurance or make self-payment for services provided; and responsibility for payment of non-covered services.     Patient would like the video invitation sent by:  Send to e-mail at: [email or web address]    Mode of Communication:  Video Conference via well    As the provider I attest to compliance with applicable laws and regulations related to telemedicine.     Treatment Plan Last Reviewed: To be completed at next session  PHQ-9 / MARKO-7 : n/a    DATA  Interactive Complexity: No  Crisis: No       Progress Since Last Session (Related to Symptoms / Goals / Homework):   Symptoms: Improving Patient reports he is sleeping better, between 8-10 hours per night. Continues to be somewhat manic but signficant improvement in feelings of anxiety    Homework: Achieved / completed to satisfaction      Episode of Care Goals: Satisfactory progress - CONTEMPLATION (Considering change and yet undecided); Intervened by assessing the negative and positive  "thinking (ambivalence) about behavior change     Current / Ongoing Stressors and Concerns:  Patient referred to the Transition Clinic by [facility] DEC  on 11/9/2021. Patient presented to ED on 11/8/2021 with his wife after recent manic episode. Per chart review, the patient had not been sleeping for approximately 4 days, had been talking nonsensically, experiencing paranoia about people disappearing at work and had been worried about his family and keeping their doors locked at their home. Per chart,  \"stressors include patient's job, wife's recent pregnancy, and 2-year-old child at home.  Patient's wife states that patient has not been exhibiting aggressive behavior, just has been talking nonstop. She states that patient \"crashed\" today physically, patient was unable to move but he could still talk.  Patient fell asleep in the ED waiting room without the use of medication.  Patient's wife states that she is seeking prescription for sleep medication for the patient and to establish mental health care or therapy for the patient.  The DEC  states that patient has become alert, is calm, but is making odd comments, such as his \"need to go home to get on a rocket ship\". Pt additionally commenting about his great grandmother having a 6th sense. Denies any injuries or acute physical complaints at this time.\" Provider in ED notes, \"Patient making odd comments, at times speaking gibberish.  Is unclear whether lindy has caused sleeplessness as well as psychosis, versus insomnia has led to lindy and psychosis.  Given that this is 1st episode of psychosis, wife states he does not use drugs and patient confirms this, we did do some medical evaluation including labs, TSH, head CT, all of which were unremarkable.  Neuro exam is unremarkable.  We did give 5 mg of Zyprexa overnight to try to help him sleep.  We will reassess in the morning.  If he seems more coherent at that time he can likely be discharged with " "outpatient follow-up.\"     Patient was discharged to home with outpatient therapy set up with [facility], start date of 12/08/2021. Patient is not currently taking any mental health medications.     Today, 11/15/21, the patient attends the session alone. The patient reports that since the last session he scheduled an appointment with [facility] to have a comprehensive neurospsych exam as well as be tested for ADHD. The patient stated that he is feeling much better than he was, is sleeping well and feels like things are moving back on track. The patient states that he is frustrated with his wife and his family right now because he felt like he was being \"babysat\" all weekend and that his family wouldn't let him leave the house, have his phone, and wouldn't let him have any of his friends over. The patient was laughing about it today but stated he felt annoyed and frustrated by this because he is \"not a child\" and stated it is his decision on how he wants to handle his health and if he decided to speak to his friends about it, it should be his decision. Apparently, the patients father, brother, and wife had a conversation about \"keeping it within the family\" when talking about the patients ED visit this past week. The patient reports feeling upset about this because it is his choice on how things are handled. The patient has an upcoming business trip scheduled and his father has now decided to \"tag along\" because the family doesn't want him to be alone at this time just in case something happens.       Treatment Objective(s) Addressed in This Session:   identify strategies to more effectively address stressors       Intervention:   LMHP will faciliatate guided discussion regarding situations and thoughts that may trigger stressors        ASSESSMENT: Current Emotional / Mental Status (status of significant symptoms):   Risk status (Self / Other harm or suicidal ideation)   Patient denies " current fears or concerns for personal safety.   Patient denies current or recent suicidal ideation or behaviors.   Patient denies current or recent homicidal ideation or behaviors.   Patient denies current or recent self injurious behavior or ideation.   Patient denies other safety concerns.   Patient reports there has been no change in risk factors since their last session.     Patient reports there has been no change in protective factors since their last session.     Recommended that patient call 911 or go to the local ED should there be a change in any of these risk factors.     Appearance:   Appropriate    Eye Contact:   Good    Psychomotor Behavior: Normal    Attitude:   Cooperative    Orientation:   All   Speech    Rate / Production: Normal     Volume:  Normal    Mood:    Normal   Affect:    Appropriate    Thought Content:  Clear    Thought Form:  Coherent  Logical    Insight:    Good      Medication Review:   No current psychiatric medications prescribed     Medication Compliance:   NA     Changes in Health Issues:   None reported     Chemical Use Review:   Substance Use: Chemical use reviewed, no active concerns identified      Tobacco Use: No current tobacco use.      Diagnosis:  1. Adjustment disorder, unspecified type F43.20       Collateral Reports Completed:   Not Applicable    PLAN: (Patient Tasks / Therapist Tasks / Other)  Patient will continue to communicate his needs to his wife and his family   Patient will prioritize his list of things that need to be done according to most emergent        Nicky Lopez [facility], November 15, 2021                                                         ______________________________________________________________________

## 2021-11-22 ENCOUNTER — VIRTUAL VISIT (OUTPATIENT)
Dept: BEHAVIORAL HEALTH | Facility: CLINIC | Age: 41
End: 2021-11-22
Payer: COMMERCIAL

## 2021-11-22 DIAGNOSIS — F43.20 ADJUSTMENT DISORDER, UNSPECIFIED TYPE: Primary | ICD-10-CM

## 2021-11-22 NOTE — PROGRESS NOTES
Discharge Summary  Multiple Sessions    Client Name: Mason Sin MRN#: [id number] YOB: 1980    Discharge Date:   November 22, 2021    Service Modality: Video Visit:      Provider verified identity through the following two step process.  Patient provided:  Patient is known previously to provider    Telemedicine Visit: The patient's condition can be safely assessed and treated via synchronous audio and visual telemedicine encounter.      Reason for Telemedicine Visit: Services only offered telehealth    Originating Site (Patient Location): Patient's home    Distant Site (Provider Location): [facility] HEALTH & ADDICTION SERVICES    Consent:  The patient/guardian has verbally consented to: the potential risks and benefits of telemedicine (video visit) versus in person care; bill my insurance or make self-payment for services provided; and responsibility for payment of non-covered services.     Patient would like the video invitation sent by:  Text to cell phone: [phone]    Mode of Communication:  Video Conference via Amwell    As the provider I attest to compliance with applicable laws and regulations related to telemedicine.    Service Type: Individual      Session Start Time: 0900  Session End Time: 0932      Session Length: 32 Minutes     Session #: 3     Attendees: Client attended alone      Focus of Treatment Objective(s):  Client's presenting concerns included: Adjustment Difficulties related to: recent purchase of a local business and changes this brings; perceived pressure of having to handle all of the finances within the family and difficulties adjusting to new role at place of employment  Functional Impairment at: home and work  Stage of Change at time of Discharge: PREPARATION (Decided to change - considering how)    Medication Adherence:  NA    Chemical Use:  NA    Assessment: Current Emotional / Mental Status (status of significant  symptoms):    Risk status (Self / Other harm or suicidal ideation)  Client denies current fears or concerns for personal safety.  Client denies current or recent suicidal ideation or behaviors.  Client denies current or recent homicidal ideation or behaviors.  Client denies current or recent self injurious behavior or ideation.  Client denies other safety concerns.  A safety and risk management plan has not been developed at this time, however client was given the after-hours number should there be a change in any of these risk factors.    Appearance:   Appropriate   Eye Contact:   Good   Psychomotor Behavior: Normal   Attitude:   Cooperative   Orientation:   All  Speech   Rate / Production: Normal    Volume:  Normal   Mood:    Normal  Affect:    Appropriate   Thought Content:  Clear   Thought Form:  Coherent  Logical   Insight:   Good     DSM5 Diagnoses: (Sustained by DSM5 Criteria Listed Above)  Diagnoses: Adjustment Disorders  309.9 (F43.20) Unspecified  Psychosocial & Contextual Factors: Recent purchase of a business has increased anxiety; changes at place of employment; perceived financial concerns; wife is having second child  WHODAS 2.0 (12 item) Score: n/a    Reason for Discharge:  Patient to start next level of care on 12/8/21; patient decided to not schedule additional sessions at the [facility]      Aftercare Plan:  Client will attend upcoming therapy appointment that is [facility]. Patient will attend his ADHD testing appointment.       [facility]icky Lopez [facility]  November 22, 2021

## 2022-04-24 ENCOUNTER — HEALTH MAINTENANCE LETTER (OUTPATIENT)
Age: 42
End: 2022-04-24

## 2022-09-26 ENCOUNTER — MYC REFILL (OUTPATIENT)
Dept: FAMILY MEDICINE | Facility: CLINIC | Age: 42
End: 2022-09-26

## 2022-09-26 DIAGNOSIS — B02.9 HERPES ZOSTER WITHOUT MENTION OF COMPLICATION: ICD-10-CM

## 2022-09-27 ENCOUNTER — MYC REFILL (OUTPATIENT)
Dept: FAMILY MEDICINE | Facility: CLINIC | Age: 42
End: 2022-09-27

## 2022-09-27 DIAGNOSIS — B02.9 HERPES ZOSTER WITHOUT MENTION OF COMPLICATION: ICD-10-CM

## 2022-09-28 ENCOUNTER — VIRTUAL VISIT (OUTPATIENT)
Dept: URGENT CARE | Facility: CLINIC | Age: 42
End: 2022-09-28
Payer: COMMERCIAL

## 2022-09-28 DIAGNOSIS — B00.9 HERPES SIMPLEX VIRUS INFECTION: Primary | ICD-10-CM

## 2022-09-28 PROCEDURE — 99203 OFFICE O/P NEW LOW 30 MIN: CPT | Mod: GT | Performed by: EMERGENCY MEDICINE

## 2022-09-28 RX ORDER — VALACYCLOVIR HYDROCHLORIDE 1 G/1
1000 TABLET, FILM COATED ORAL 3 TIMES DAILY
Qty: 21 TABLET | Refills: 0 | OUTPATIENT
Start: 2022-09-28

## 2022-09-28 RX ORDER — VALACYCLOVIR HYDROCHLORIDE 1 G/1
1000 TABLET, FILM COATED ORAL DAILY
Qty: 5 TABLET | Refills: 1 | Status: SHIPPED | OUTPATIENT
Start: 2022-09-28 | End: 2023-06-16

## 2022-09-28 NOTE — PROGRESS NOTES
Video visit:  Start time: 3:30 PM  Stop time: 3:35 PM  Duration: 5 minutes  Patient location: Home  Provider location:  [facility] [place] virtual provider (remote)  Platform used for video visit: Sara    CHIEF COMPLAINT: Herpes simplex recurrence      HPI: Patient is a 41-year-old male who has a history of recurrent herpes simplex on his right face.  He has had recurrences for 30 years and understands the symptoms.      ROS: See HPI otherwise normal.    No Known Allergies   Current Outpatient Medications   Medication Sig Dispense Refill     valACYclovir (VALTREX) 1000 mg tablet Take 1 tablet (1,000 mg) by mouth daily for 5 days 5 tablet 1     NO ACTIVE MEDICATIONS        valACYclovir (VALTREX) 1000 mg tablet Take 1 tablet by mouth 3 times daily. 21 tablet 0         PE: No acute distress on video visit.  Appears comfortable.  Alert and oriented.        TREATMENT: None      ASSESSMENT: Recurrent herpes simplex in a 41-year-old male who is comfortable due to multiple recurrences over many years.  Patient is asking for refill on his valacyclovir.      DIAGNOSIS: Herpes simplex.      PLAN: Valacyclovir as instructed.  Follow-up with medical care if any concerns.

## 2023-04-10 ENCOUNTER — OFFICE VISIT (OUTPATIENT)
Dept: FAMILY MEDICINE | Facility: CLINIC | Age: 43
End: 2023-04-10
Payer: COMMERCIAL

## 2023-04-10 VITALS
WEIGHT: 238.1 LBS | DIASTOLIC BLOOD PRESSURE: 77 MMHG | HEART RATE: 71 BPM | HEIGHT: 73 IN | OXYGEN SATURATION: 98 % | RESPIRATION RATE: 16 BRPM | SYSTOLIC BLOOD PRESSURE: 133 MMHG | BODY MASS INDEX: 31.56 KG/M2

## 2023-04-10 DIAGNOSIS — S39.012A STRAIN OF LUMBAR PARASPINOUS MUSCLE, INITIAL ENCOUNTER: Primary | ICD-10-CM

## 2023-04-10 PROCEDURE — 99203 OFFICE O/P NEW LOW 30 MIN: CPT

## 2023-04-10 RX ORDER — INFLUENZA A VIRUS A/GUANGDONG-MAONAN/SWL1536/2019 CNIC-1909 (H1N1) ANTIGEN (FORMALDEHYDE INACTIVATED), INFLUENZA A VIRUS A/HONG KONG/2671/2019 (H3N2) ANTIGEN (FORMALDEHYDE INACTIVATED), INFLUENZA B VIRUS B/PHUKET/3073/2013 ANTIGEN (FORMALDEHYDE INACTIVATED), AND INFLUENZA B VIRUS B/WASHINGTON/02/2019 ANTIGEN (FORMALDEHYDE INACTIVATED) 15; 15; 15; 15 UG/.5ML; UG/.5ML; UG/.5ML; UG/.5ML
INJECTION, SUSPENSION INTRAMUSCULAR
COMMUNITY
Start: 2022-09-25 | End: 2023-05-22

## 2023-04-10 RX ORDER — BNT162B2 ORIGINAL AND OMICRON BA.4/BA.5 .1125; .1125 MG/2.25ML; MG/2.25ML
INJECTION, SUSPENSION INTRAMUSCULAR
COMMUNITY
Start: 2022-09-25 | End: 2023-05-22

## 2023-04-10 ASSESSMENT — ENCOUNTER SYMPTOMS: BACK PAIN: 1

## 2023-04-10 NOTE — PATIENT INSTRUCTIONS
I would recommend regular over-the-counter pain medications for the next couple of days, including ibuprofen and Tylenol.  You can also try over-the-counter Voltaren or IcyHot gel to the area that is most painful.  It is important to continue moving and employ gentle stretching so that muscles do not tighten up.  If symptoms do not continue to improve, or they are still present in 2 weeks, I would recommend trialing a course of physical therapy.  After a couple weeks of physical therapy, if you are not improving, that is when I would consider more advanced imaging of the back.  If symptoms persist, they worsen or you develop any new concerning symptoms (such as weakness, numbness/tingling, loss of control bowel or bladder) you need to be seen again.

## 2023-04-10 NOTE — PROGRESS NOTES
"  Assessment & Plan   Problem List Items Addressed This Visit        Musculoskeletal and Integumentary    Strain of lumbar paraspinous muscle, initial encounter - Primary     Reassuring that symptoms are improving.  Localized pain associated with twisting and bending is consistent with a lumbar muscle strain. Less consistent with lumbar spine pathology.  No red flag symptoms on exam.  Discussed expected trajectory of pain, including gradual improvement over the next couple weeks.  If he is not having improvement, would recommend physical therapy.  Discussed that if physical therapy is not helpful, will consider more advanced imaging at that time.  Discussed the use of over-the-counter analgesics, such as ibuprofen and Tylenol.  He can also trial topical analgesics such as Voltaren.  Discussed the importance of gentle stretching and movement for continued improvement.  Should seek care if symptoms persist, worsen or he develops new concerning symptoms.  Patient expressed understanding of and comfort with this plan.  All questions were answered.             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              Injury occurred 4 days ago.  Patient reports that he was using a tractor to help a vehicle become unstuck from the mud.  While he was using the tractor, he was twisting significantly in addition to having multiple \"jerking\" movements.  The seat he was sitting on hit him in the middle of his back, which she feels like the injury came from.  Since the initial injury, his pain has improved.  It is currently a 3-4/10, whereas previously it was more of a 6/10.  Is dull and achy in quality.  Denies any radiation.  Denies any lower extremity paresthesia or weakness.  Denies any saddle anesthesia or " "loss of control of bowel or bladder.  Denies any previous injury to the back.  Pain is worsened with certain movements, bending or twisting.      Back Pain     History of Present Illness       Back Pain:  He presents for follow up of back pain. Patient's back pain is a new problem.    Original cause of back pain: other  First noticed back pain: today  Patient feels back pain: constantlyLocation of back pain:  Right middle of back and left middle of back  Description of back pain: dull ache and stabbing  Back pain spreads: nowhere    Since patient first noticed back pain, pain is: gradually worsening  Does back pain interfere with his job:  Not applicable  On a scale of 1-10 (10 being the worst), patient describes pain as:  4  What makes back pain worse: bending, sitting, standing and twisting  Acupuncture: not tried  Acetaminophen: not tried  Activity or exercise: not tried  Chiropractor:  Not tried  Cold: not tried  Heat: not tried  Massage: not tried  Muscle relaxants: not tried  NSAIDS: not tried  Opioids: not tried  Physical Therapy: not tried  Rest: not tried  Steroid Injection: not tried  Stretching: not tried  Surgery: not tried  TENS unit: not tried  Topical pain relievers: not tried  Other healthcare providers patient is seeing for back pain: None    He eats 2-3 servings of fruits and vegetables daily.He consumes 0 sweetened beverage(s) daily.He exercises with enough effort to increase his heart rate 10 to 19 minutes per day.  He exercises with enough effort to increase his heart rate 5 days per week.   He is taking medications regularly.       Review of Systems   Musculoskeletal: Positive for back pain.          Objective    /77 (BP Location: Left arm, Patient Position: Sitting, Cuff Size: Adult Large)   Pulse 71   Resp 16   Ht 1.854 m (6' 1\")   Wt 108 kg (238 lb 1.6 oz)   SpO2 98%   BMI 31.41 kg/m    Body mass index is 31.41 kg/m .     Physical Exam  Vitals and nursing note reviewed. "   Constitutional:       General: He is not in acute distress.     Appearance: Normal appearance. He is not ill-appearing.   Musculoskeletal:      Lumbar back: Normal. No swelling, spasms or bony tenderness.      Right lower leg: No swelling.      Left lower leg: No swelling.      Comments: Unable to reproduce tenderness with palpation of the paraspinal muscles.  Muscle tightness with right straight leg raise.  5/5 strength BLE   Neurological:      Mental Status: He is alert.

## 2023-04-10 NOTE — ASSESSMENT & PLAN NOTE
Reassuring that symptoms are improving.  Localized pain associated with twisting and bending is consistent with a lumbar muscle strain. Less consistent with lumbar spine pathology.  No red flag symptoms on exam.  Discussed expected trajectory of pain, including gradual improvement over the next couple weeks.  If he is not having improvement, would recommend physical therapy.  Discussed that if physical therapy is not helpful, will consider more advanced imaging at that time.  Discussed the use of over-the-counter analgesics, such as ibuprofen and Tylenol.  He can also trial topical analgesics such as Voltaren.  Discussed the importance of gentle stretching and movement for continued improvement.  Should seek care if symptoms persist, worsen or he develops new concerning symptoms.  Patient expressed understanding of and comfort with this plan.  All questions were answered.

## 2023-05-21 ENCOUNTER — HOSPITAL ENCOUNTER (EMERGENCY)
Facility: CLINIC | Age: 43
Discharge: HOME | End: 2023-05-21
Attending: EMERGENCY MEDICINE | Admitting: EMERGENCY MEDICINE
Payer: COMMERCIAL

## 2023-05-21 ENCOUNTER — APPOINTMENT (OUTPATIENT)
Dept: GENERAL RADIOLOGY | Facility: CLINIC | Age: 43
End: 2023-05-21
Attending: EMERGENCY MEDICINE
Payer: COMMERCIAL

## 2023-05-21 VITALS
OXYGEN SATURATION: 97 % | HEART RATE: 87 BPM | BODY MASS INDEX: 30.48 KG/M2 | WEIGHT: 230 LBS | SYSTOLIC BLOOD PRESSURE: 131 MMHG | TEMPERATURE: 98 F | DIASTOLIC BLOOD PRESSURE: 87 MMHG | HEIGHT: 73 IN | RESPIRATION RATE: 18 BRPM

## 2023-05-21 DIAGNOSIS — S96.909A INJURY OF TENDON OF FOOT: ICD-10-CM

## 2023-05-21 DIAGNOSIS — S91.312A LACERATION OF LEFT FOOT, INITIAL ENCOUNTER: ICD-10-CM

## 2023-05-21 PROCEDURE — 12001 RPR S/N/AX/GEN/TRNK 2.5CM/<: CPT | Performed by: EMERGENCY MEDICINE

## 2023-05-21 PROCEDURE — 73630 X-RAY EXAM OF FOOT: CPT | Mod: LT

## 2023-05-21 PROCEDURE — 99283 EMERGENCY DEPT VISIT LOW MDM: CPT | Mod: 25 | Performed by: EMERGENCY MEDICINE

## 2023-05-21 PROCEDURE — 99283 EMERGENCY DEPT VISIT LOW MDM: CPT | Performed by: EMERGENCY MEDICINE

## 2023-05-21 ASSESSMENT — ACTIVITIES OF DAILY LIVING (ADL)
ADLS_ACUITY_SCORE: 35
ADLS_ACUITY_SCORE: 35

## 2023-05-21 NOTE — ED TRIAGE NOTES
Dropped sheet metal on top of left foot with laceration     Triage Assessment     Row Name 05/21/23 7182       Triage Assessment (Adult)    Airway WDL WDL       Respiratory WDL    Respiratory WDL WDL       Skin Circulation/Temperature WDL    Skin Circulation/Temperature WDL X       Peripheral/Neurovascular WDL    Peripheral Neurovascular WDL WDL

## 2023-05-21 NOTE — DISCHARGE INSTRUCTIONS
I am concerned that you have an injury to the extensor tendon of you big toe.     A referral for follow up has been placed and you should receive a call to set up a follow up appointment.     Please keep the wound clean and dry for 24-48 hours. The affected area should be kept elevated as much as possible.  After 24 hours, the dressing may be removed and the wound may be gently cleaned with warm water and soap.  You may apply petroleum based ointment as desired.  Sutures will need to be removed in 7-10 days.    Any time the skin is damaged, scar formation is unavoidable. You can minimize the scar by following the above instructions and preventing infection. The scar will continue to evolve for at least 1 year. Final appearance of the scar will not be known until at least that time. Please apply sun screen to the scar before any sun exposure for the first year as sunburn or even tanning may cause the scar to become discolored and lead to poor cosmetic outcomes. If after 1 year, you are unhappy with the appearance of the scar you should seek follow-up with the appropriate health care professional to discuss further options.    You should return to the emergency department or your primary care physician immediately if you develop warmth, redness, swelling, drainage from the wound, or have fevers.

## 2023-05-22 ENCOUNTER — OFFICE VISIT (OUTPATIENT)
Dept: FAMILY MEDICINE | Facility: CLINIC | Age: 43
End: 2023-05-22
Payer: COMMERCIAL

## 2023-05-22 ENCOUNTER — OFFICE VISIT (OUTPATIENT)
Dept: PODIATRY | Facility: CLINIC | Age: 43
End: 2023-05-22
Payer: COMMERCIAL

## 2023-05-22 ENCOUNTER — PREP FOR PROCEDURE (OUTPATIENT)
Dept: PODIATRY | Facility: CLINIC | Age: 43
End: 2023-05-22

## 2023-05-22 ENCOUNTER — HOSPITAL ENCOUNTER (OUTPATIENT)
Dept: MRI IMAGING | Facility: CLINIC | Age: 43
Discharge: HOME | End: 2023-05-22
Attending: PODIATRIST | Admitting: PODIATRIST
Payer: COMMERCIAL

## 2023-05-22 VITALS
HEIGHT: 73 IN | SYSTOLIC BLOOD PRESSURE: 120 MMHG | HEART RATE: 70 BPM | BODY MASS INDEX: 31.36 KG/M2 | WEIGHT: 236.6 LBS | RESPIRATION RATE: 18 BRPM | OXYGEN SATURATION: 97 % | TEMPERATURE: 97.2 F | DIASTOLIC BLOOD PRESSURE: 76 MMHG

## 2023-05-22 VITALS
HEIGHT: 73 IN | DIASTOLIC BLOOD PRESSURE: 78 MMHG | BODY MASS INDEX: 30.48 KG/M2 | WEIGHT: 230 LBS | SYSTOLIC BLOOD PRESSURE: 124 MMHG

## 2023-05-22 DIAGNOSIS — S91.312D LACERATION OF LEFT FOOT, SUBSEQUENT ENCOUNTER: ICD-10-CM

## 2023-05-22 DIAGNOSIS — S96.822A LACERATION OF EXTENSOR TENDON OF LEFT FOOT, INITIAL ENCOUNTER: Primary | ICD-10-CM

## 2023-05-22 DIAGNOSIS — Z01.818 PREOP GENERAL PHYSICAL EXAM: Primary | ICD-10-CM

## 2023-05-22 DIAGNOSIS — S91.312A LACERATION OF LEFT FOOT, INITIAL ENCOUNTER: ICD-10-CM

## 2023-05-22 DIAGNOSIS — Z13.220 LIPID SCREENING: ICD-10-CM

## 2023-05-22 DIAGNOSIS — S96.909A INJURY OF TENDON OF FOOT: ICD-10-CM

## 2023-05-22 LAB
ANION GAP SERPL CALCULATED.3IONS-SCNC: 14 MMOL/L (ref 7–15)
BUN SERPL-MCNC: 18.8 MG/DL (ref 6–20)
CALCIUM SERPL-MCNC: 9.3 MG/DL (ref 8.6–10)
CHLORIDE SERPL-SCNC: 105 MMOL/L (ref 98–107)
CHOLEST SERPL-MCNC: 198 MG/DL
CREAT SERPL-MCNC: 1.02 MG/DL (ref 0.67–1.17)
DEPRECATED HCO3 PLAS-SCNC: 21 MMOL/L (ref 22–29)
ERYTHROCYTE [DISTWIDTH] IN BLOOD BY AUTOMATED COUNT: 12.8 % (ref 10–15)
GFR SERPL CREATININE-BSD FRML MDRD: >90 ML/MIN/1.73M2
GLUCOSE SERPL-MCNC: 97 MG/DL (ref 70–99)
HCT VFR BLD AUTO: 45.8 % (ref 40–53)
HDLC SERPL-MCNC: 43 MG/DL
HGB BLD-MCNC: 15.7 G/DL (ref 13.3–17.7)
LDLC SERPL CALC-MCNC: 118 MG/DL
MCH RBC QN AUTO: 28.2 PG (ref 26.5–33)
MCHC RBC AUTO-ENTMCNC: 34.3 G/DL (ref 31.5–36.5)
MCV RBC AUTO: 82 FL (ref 78–100)
NONHDLC SERPL-MCNC: 155 MG/DL
PLATELET # BLD AUTO: 242 10E3/UL (ref 150–450)
POTASSIUM SERPL-SCNC: 4.1 MMOL/L (ref 3.4–5.3)
RBC # BLD AUTO: 5.56 10E6/UL (ref 4.4–5.9)
SODIUM SERPL-SCNC: 140 MMOL/L (ref 136–145)
TRIGL SERPL-MCNC: 185 MG/DL
WBC # BLD AUTO: 6.2 10E3/UL (ref 4–11)

## 2023-05-22 PROCEDURE — 99204 OFFICE O/P NEW MOD 45 MIN: CPT | Performed by: PODIATRIST

## 2023-05-22 PROCEDURE — 85027 COMPLETE CBC AUTOMATED: CPT | Performed by: NURSE PRACTITIONER

## 2023-05-22 PROCEDURE — 80061 LIPID PANEL: CPT | Performed by: NURSE PRACTITIONER

## 2023-05-22 PROCEDURE — 99214 OFFICE O/P EST MOD 30 MIN: CPT | Performed by: NURSE PRACTITIONER

## 2023-05-22 PROCEDURE — 73718 MRI LOWER EXTREMITY W/O DYE: CPT | Mod: LT

## 2023-05-22 PROCEDURE — 36415 COLL VENOUS BLD VENIPUNCTURE: CPT | Performed by: NURSE PRACTITIONER

## 2023-05-22 PROCEDURE — 80048 BASIC METABOLIC PNL TOTAL CA: CPT | Performed by: NURSE PRACTITIONER

## 2023-05-22 NOTE — PATIENT INSTRUCTIONS
"Thank you for choosing [facility] Podiatry / Foot & Ankle Surgery!    DR. PARKER'S CLINIC LOCATIONS:     [facility] (Friday) TRIAGE LINE: [phone] [address]  APPOINTMENTS: [phone]   [place] [address] RADIOLOGY: [phone]    PHYSICAL THERAPY: [phone]    SET UP SURGERY: [phone]   [place] (Mon-Tues AM-Thurs) BILLING QUESTIONS: [phone]   [address] [address] FAX: [phone]   [place] [id number]       FOOT & ANKLE SURGERY PLANNING CHECKLIST  If you have decided to have surgery, follow these steps to get the procedure scheduled and to have the proper paperwork filled out. If you are unsure about surgery or would like to sit down and further discuss your issue and treatment options, please make an appointment.    1.  Pick the date that you would like to have surgery. Surgery is done on a Wednesday at [facility]. Keep in mind that you will likely need at least 2 weeks off after the procedure for proper rest and healing.    2.  Call the surgery scheduling line at [phone] to get the procedure scheduled. Our  will also help you make your pre-operative physical with a primary doctor, your surgery consult appointment with me and your one week follow up after surgery for your first dressing change.    3.  If our surgery scheduler does not make your surgery consultation appointment with me then call to schedule that. When making the appointment, say \"I need to make a 30 minute surgical consult appointment\".     4. Contact your employer to request time off from work if needed. If there is going to be FMLA used, please have them fax the forms to [phone] at least one week prior to surgery.    * If you have any post-operative questions regarding your procedure, call our triage team at the [facility] at [phone].    Follow Up: PRN  "

## 2023-05-22 NOTE — PROGRESS NOTES
[address] [address]  Phone: [phone]  Primary Provider: No Ref-Primary, Physician  Pre-op Performing Provider: DANIKA QUEZADA      PREOPERATIVE EVALUATION:  Today's date: 5/22/2023    Mason Sin is a 42 year old male who presents for a preoperative evaluation.      5/22/2023     1:08 PM   Additional Questions   Roomed by Beckie Dominguez MA   Accompanied by N/A         5/22/2023     1:08 PM   Patient Reported Additional Medications   Patient reports taking the following new medications None     Surgical Information:  Surgery/Procedure: Repair of left extensor hallucis longus tendon  Surgery Location: [facility]  Surgeon: Sara  Surgery Date: 5/24/23  Time of Surgery: 1:50 PM  Where patient plans to recover: At home with family  Fax number for surgical facility: Note does not need to be faxed, will be available electronically in Epic.    Assessment & Plan     The proposed surgical procedure is considered INTERMEDIATE risk.    Preop general physical exam  Cleared for surgery. CBC normal. BMP pending and will be available in Epic.    - CBC with platelets  - Basic metabolic panel  (Ca, Cl, CO2, Creat, Gluc, K, Na, BUN)    Laceration of left foot, subsequent encounter  Reason for surgery.     - CBC with platelets  - Basic metabolic panel  (Ca, Cl, CO2, Creat, Gluc, K, Na, BUN)    Injury of tendon of foot  Reason for surgery.     - CBC with platelets  - Basic metabolic panel  (Ca, Cl, CO2, Creat, Gluc, K, Na, BUN)    Lipid screening  Screening.     - Lipid panel reflex to direct LDL Non-fasting            - No identified additional risk factors other than previously addressed    Antiplatelet or Anticoagulation Medication Instructions:   - Patient is on no antiplatelet or anticoagulation medications.    Additional Medication Instructions:  Patient is on no additional chronic medications    RECOMMENDATION:  APPROVAL GIVEN to proceed with  proposed procedure, without further diagnostic evaluation.    Ordering of each unique test        Subjective       HPI related to upcoming procedure: Patient dropped a sheet of metal on his left foot yesterday. He has MRI scheduled for tonight and surgery scheduled for 5/24/23 if indicated due to suspected ligament injury.        5/22/2023    12:51 PM   Preop Questions   1. Have you ever had a heart attack or stroke? No   2. Have you ever had surgery on your heart or blood vessels, such as a stent placement, a coronary artery bypass, or surgery on an artery in your head, neck, heart, or legs? No   3. Do you have chest pain with activity? No   4. Do you have a history of  heart failure? No   5. Do you currently have a cold, bronchitis or symptoms of other infection? No   6. Do you have a cough, shortness of breath, or wheezing? No   7. Do you or anyone in your family have previous history of blood clots? No   8. Do you or does anyone in your family have a serious bleeding problem such as prolonged bleeding following surgeries or cuts? No   9. Have you ever had problems with anemia or been told to take iron pills? No   10. Have you had any abnormal blood loss such as black, tarry or bloody stools? No   11. Have you ever had a blood transfusion? No   12. Are you willing to have a blood transfusion if it is medically needed before, during, or after your surgery? Yes   13. Have you or any of your relatives ever had problems with anesthesia? No   14. Do you have sleep apnea, excessive snoring or daytime drowsiness? No   15. Do you have any artifical heart valves or other implanted medical devices like a pacemaker, defibrillator, or continuous glucose monitor? No   16. Do you have artificial joints? No   17. Are you allergic to latex? No       Health Care Directive:  Patient does not have a Health Care Directive or Living Will:     Preoperative Review of :   reviewed - no record of controlled substances  prescribed.      Status of Chronic Conditions:  See problem list for active medical problems.  Problems all longstanding and stable, except as noted/documented.  See ROS for pertinent symptoms related to these conditions.      Review of Systems  CONSTITUTIONAL: NEGATIVE for fever, chills, change in weight  INTEGUMENTARY/SKIN: NEGATIVE for worrisome rashes, moles or lesions  EYES: NEGATIVE for vision changes or irritation  ENT/MOUTH: NEGATIVE for ear, mouth and throat problems  RESP: NEGATIVE for significant cough or SOB  CV: NEGATIVE for chest pain, palpitations or peripheral edema  GI: NEGATIVE for nausea, abdominal pain, heartburn, or change in bowel habits  : NEGATIVE for frequency, dysuria, or hematuria  MUSCULOSKELETAL: NEGATIVE for significant arthralgias or myalgia  NEURO: NEGATIVE for weakness, dizziness or paresthesias  ENDOCRINE: NEGATIVE for temperature intolerance, skin/hair changes  HEME: NEGATIVE for bleeding problems  PSYCHIATRIC: NEGATIVE for changes in mood or affect    Patient Active Problem List    Diagnosis Date Noted     Strain of lumbar paraspinous muscle, initial encounter 04/10/2023     Priority: Medium     Recurrent herpes simplex 11/05/2012     Priority: Medium     Recurrent herpes patch on right side of face since childhood- triggered by weather change- or change of seasons- 21 tablets usually lasts him a year         CARDIOVASCULAR SCREENING; LDL GOAL LESS THAN 160 10/31/2010     Priority: Medium     TMJ (temporomandibular joint syndrome) 07/01/2009     Priority: Medium      Past Medical History:   Diagnosis Date     NO ACTIVE PROBLEMS      Past Surgical History:   Procedure Laterality Date     NO HISTORY OF SURGERY       Current Outpatient Medications   Medication Sig Dispense Refill     FLUZONE QUADRIVALENT 0.5 ML injection        PFIZER COVID-19 VAC BIVALENT 30 MCG/0.3ML injection        NO ACTIVE MEDICATIONS  (Patient not taking: Reported on 4/10/2023)       valACYclovir (VALTREX)  "1000 mg tablet Take 1 tablet (1,000 mg) by mouth daily for 5 days 5 tablet 1       No Known Allergies     Social History     Tobacco Use     Smoking status: Never     Smokeless tobacco: Never   Vaping Use     Vaping status: Never Used   Substance Use Topics     Alcohol use: Not Currently     Comment: occ     History reviewed. No pertinent family history.  History   Drug Use No         Objective     /76 (BP Location: Right arm, Patient Position: Chair, Cuff Size: Adult Large)   Pulse 70   Temp 97.2  F (36.2  C) (Tympanic)   Resp 18   Ht 1.854 m (6' 1\")   Wt 107.3 kg (236 lb 9.6 oz)   SpO2 97%   BMI 31.22 kg/m      Physical Exam    GENERAL APPEARANCE: healthy, alert and no distress     EYES: EOMI,  PERRL     HENT: ear canals and TM's normal and nose and mouth without ulcers or lesions     NECK: no adenopathy, no asymmetry, masses, or scars and thyroid normal to palpation     RESP: lungs clear to auscultation - no rales, rhonchi or wheezes     CV: regular rates and rhythm, normal S1 S2, no S3 or S4 and no murmur, click or rub     ABDOMEN:  soft, nontender, no HSM or masses and bowel sounds normal     MS: extremities normal- no gross deformities noted, no evidence of inflammation in joints, FROM in all extremities.     SKIN: no suspicious lesions or rashes     NEURO: Normal strength and tone, sensory exam grossly normal, mentation intact and speech normal     PSYCH: mentation appears normal. and affect normal/bright     LYMPHATICS: No cervical adenopathy    Recent Labs   Lab Test 11/09/21  0230   HGB 16.4         POTASSIUM 4.0   CR 1.08        Diagnostics:  Recent Results (from the past 24 hour(s))   CBC with platelets    Collection Time: 05/22/23  2:00 PM   Result Value Ref Range    WBC Count 6.2 4.0 - 11.0 10e3/uL    RBC Count 5.56 4.40 - 5.90 10e6/uL    Hemoglobin 15.7 13.3 - 17.7 g/dL    Hematocrit 45.8 40.0 - 53.0 %    MCV 82 78 - 100 fL    MCH 28.2 26.5 - 33.0 pg    MCHC 34.3 31.5 - 36.5 " g/dL    RDW 12.8 10.0 - 15.0 %    Platelet Count 242 150 - 450 10e3/uL      No EKG required, no history of coronary heart disease, significant arrhythmia, peripheral arterial disease or other structural heart disease.    Revised Cardiac Risk Index (RCRI):  The patient has the following serious cardiovascular risks for perioperative complications:   - No serious cardiac risks = 0 points     RCRI Interpretation: 0 points: Class I (very low risk - 0.4% complication rate)           Signed Electronically by: Natalya Cannon DNP  Copy of this evaluation report is provided to requesting physician.

## 2023-05-22 NOTE — PROGRESS NOTES
"Foot & Ankle Surgery  May 22, 2023    CC: \"Consult for potential tendon repair surgery\"    I was asked to see Mason Sin regarding the chief complaint by: ER    HPI:  Pt is a 42 year old male who presents with above complaint.  Patient dropped a sheet metal on the top of his left foot.  He was seen in the ER where x-rays demonstrated a dorsal soft tissue defect but no fractures or foreign bodies are seen.  Pain 6 out of 10 \"when moving or flexing the left foot\".  This is worse with \"movement of the big toe\".  \"Mobilized foot, stitches for initial laceration\" for treatment.  He had this washed out and repaired in the ER.  He was working on a project for his wife and was wearing boots during the project.  However, afterwards, he was cleaning up and wearing sandals when a piece of the sheet-metal landed on his foot.    ROS:   Pos for CC.  The patient denies current nausea, vomiting, chills, fevers, belly pain, calf pain, chest pain or SOB.  Complete remainder of ROS is otherwise neg.    VITALS:  There were no vitals filed for this visit.    PMH:    Past Medical History:   Diagnosis Date     NO ACTIVE PROBLEMS        SXHX:    Past Surgical History:   Procedure Laterality Date     NO HISTORY OF SURGERY          MEDS:    Current Outpatient Medications   Medication     FLUZONE QUADRIVALENT 0.5 ML injection     NO ACTIVE MEDICATIONS     PFIZER COVID-19 VAC BIVALENT 30 MCG/0.3ML injection     valACYclovir (VALTREX) 1000 mg tablet     valACYclovir (VALTREX) 1000 mg tablet     No current facility-administered medications for this visit.       ALL:   No Known Allergies    FMH:  No family history on file.    SocHx:    Social History     Socioeconomic History     Marital status:      Spouse name: Not on file     Number of children: Not on file     Years of education: Not on file     Highest education level: Not on file   Occupational History     Not on file   Tobacco Use     Smoking status: Never     Smokeless tobacco: " Never   Vaping Use     Vaping status: Never Used   Substance and Sexual Activity     Alcohol use: Yes     Comment: occ     Drug use: No     Sexual activity: Yes     Partners: Female   Other Topics Concern      Service No     Blood Transfusions No     Caffeine Concern No     Occupational Exposure No     Hobby Hazards Yes     Comment: Flying     Sleep Concern No     Stress Concern No     Weight Concern No     Special Diet No     Back Care No     Exercise Yes     Bike Helmet Yes     Seat Belt Yes     Self-Exams Yes     Parent/sibling w/ CABG, MI or angioplasty before 65F 55M? No   Social History Narrative     Not on file     Social Determinants of Health     Financial Resource Strain: Not on file   Food Insecurity: Not on file   Transportation Needs: Not on file   Physical Activity: Not on file   Stress: Not on file   Social Connections: Not on file   Intimate Partner Violence: Not on file   Housing Stability: Not on file           EXAMINATION:  Gen:   No apparent distress  Neuro:   A&Ox3, no deficits  Psych:    Answering questions appropriately for age and situation with normal affect  Head:    NCAT  Eye:    Visual scanning without deficit  Ear:    Response to auditory stimuli wnl  Lung:    Non-labored breathing on RA noted  Abd:    NTND per patient report  Lymph:    Neg for pitting/non-pitting edema BLE  Vasc:    Pulses palpable, CFT minimally delayed  Neuro:    Light touch sensation intact to all sensory nerve distributions without paresthesias  Derm:    Clean appearing laceration at the midshaft of the first metatarsal without signs of infection.  MSK:    Left lower extremity - limited extension of hallux at MPJ, minimal extension at the level of the IPJ  Calf:    Neg for redness, swelling or tenderness      Imaging:  IMPRESSION: The left foot is negative for fracture or dislocation. There is a soft tissue defect along the dorsal aspect of the foot on the lateral view. No foreign bodies are  identified.    Assessment:  42 year old male with suspected EHL laceration left lower extremity       Medical Decision Making/Plan:  Discussed etiologies, anatomy and options  1.  Suspected left lower extremity EHL laceration  -I personally reviewed and interpreted the patient's lower extremity history pertinent to today's visit, including imaging/labs, in preparation for initiating a treatment program.  -I personally reviewed and interpreted his ER x-rays from yesterday  -An MRI was ordered for further assessment.  -Anticipating there is an EHL laceration, we discussed surgical repair, including end-to-end primary anastomosis versus side-to-side anastomosis with EHB versus cadaveric grafting.  We reviewed the procedure and a generic postop recovery.  I anticipate touchdown weightbearing starting postop day 0 in a boot with crutches with increase in weight on the foot starting 6 weeks postop  -Our surgery scheduling handout was dispensed in the surgery order was signed.    Job duties; time off work - ; works from home.  Smoking history - neg  Vit D - n/a  Diabetic/A1c - neg  Hemoglobin - draw prior  Clot history - neg/neg  Allergies to surgical implants/suture - neg  Allergies/issues with narcotics - neg    Procedure(s):  1.  Left extensor tendon repair  Consent: above  Diagnosis:  EHL laceration  Equipment/Vendor: none      Follow up:  prn or sooner with acute issues      Patient's medical history was reviewed today      Prince Ruiz DPM FACFAS FACFAOM  Podiatric Foot & Ankle Surgeon  [facility]  [phone]    Disclaimer: This note consists of symbols derived from keyboarding, dictation and/or voice recognition software. As a result, there may be errors in the script that have gone undetected. Please consider this when interpreting information found in this chart.

## 2023-05-22 NOTE — ED PROVIDER NOTES
History     Chief Complaint   Patient presents with     Laceration     HPI  aMson Sin is a 42 year old male with 2 cm laceration to dorsal surface of left foot over mid/distal metatarsal in setting of dropping piece of tin onto foot while working on a project in his garage.  Injury occurred today and was accidental.  Not currently on any medications and no known medication allergies.    Chart review shows that tetanus is up to date.    The patient's PMHx, Surgical Hx, Allergies, and Medications were all reviewed with the patient.    Allergies:  No Known Allergies    Problem List:    Patient Active Problem List    Diagnosis Date Noted     Strain of lumbar paraspinous muscle, initial encounter 04/10/2023     Priority: Medium     Recurrent herpes simplex 11/05/2012     Priority: Medium     Recurrent herpes patch on right side of face since childhood- triggered by weather change- or change of seasons- 21 tablets usually lasts him a year         CARDIOVASCULAR SCREENING; LDL GOAL LESS THAN 160 10/31/2010     Priority: Medium     TMJ (temporomandibular joint syndrome) 07/01/2009     Priority: Medium        Past Medical History:    Past Medical History:   Diagnosis Date     NO ACTIVE PROBLEMS        Past Surgical History:    Past Surgical History:   Procedure Laterality Date     NO HISTORY OF SURGERY         Family History:    No family history on file.    Social History:  Marital Status:   [2]  Social History     Tobacco Use     Smoking status: Never     Smokeless tobacco: Never   Vaping Use     Vaping status: Never Used   Substance Use Topics     Alcohol use: Not Currently     Comment: occ     Drug use: No        Medications:    FLUZONE QUADRIVALENT 0.5 ML injection  NO ACTIVE MEDICATIONS  PFIZER COVID-19 VAC BIVALENT 30 MCG/0.3ML injection  valACYclovir (VALTREX) 1000 mg tablet          Review of Systems  Pertinent positives and negatives mentioned in HPI    Physical Exam   BP: 131/87  Pulse: 87  Temp: 98  " F (36.7  C)  Resp: 18  Height: 185.4 cm (6' 1\")  Weight: 104.3 kg (230 lb)  SpO2: 97 %    Physical Exam  GEN: Awake, alert, and cooperative. No acute distress.  HENT: MMM. External ears and nose normal bilaterally.  Atraumatic   EYES: EOM intact. Conjunctiva clear. No discharge.   PULM: Normal effort.  Speaking in full sentences with no audible wheezing  NEURO: Normal speech. Following commands. CN II-XII grossly intact. Answering questions and interacting appropriately.   EXT: No gross deformity.  2 cm curvilinear laceration on dorsum of left foot over mid/distal metatarsal not involving MTP.  Sensation is intact to light touch and able to move all toes.  There is weakness in extension of the great toe concern for possible injury to extensor tendon of hallux longus.  Wound was probed and I could not visualize tendon.    INT: Warm. No diaphoresis.        ED [facility]    -Laceration Repair    Date/Time: 5/22/2023 1:54 PM    Performed by: Fam Martin MD  Authorized by: Fam Martin MD    Risks, benefits and alternatives discussed.      ANESTHESIA (see MAR for exact dosages):     Anesthesia method:  Local infiltration    Local anesthetic:  Bupivacaine 0.25% WITH epi  LACERATION DETAILS     Location:  Foot    Foot location:  Top of L foot    Length (cm):  2    Depth (mm):  3    REPAIR TYPE:     Repair type:  Simple        TREATMENT:     Irrigation solution:  Sterile saline    Irrigation volume:  250    Irrigation method:  Syringe    Visualized foreign bodies/material removed: no      SKIN REPAIR     Repair method:  Sutures    Suture size:  3-0    Suture material:  Nylon    Suture technique:  Simple interrupted    Number of sutures:  4    APPROXIMATION     Approximation:  Close    POST-PROCEDURE DETAILS     Dressing:  Sterile dressing        PROCEDURE    Patient Tolerance:  Patient tolerated the procedure well with no immediate complications           Critical " Care time:  none               Results for orders placed or performed during the hospital encounter of 05/21/23 (from the past 24 hour(s))   Foot XR, G/E 3 views, left    Narrative    EXAM: XR FOOT LEFT G/E 3 VIEWS  LOCATION: [facility]  DATE/TIME: 5/21/2023 6:17 PM CDT    INDICATION: Penetrating trauma to dorsum of foot with likely EHL disruption. Please eval for underlying fracture or radiopaque foreign body.  COMPARISON: None.      Impression    IMPRESSION: The left foot is negative for fracture or dislocation. There is a soft tissue defect along the dorsal aspect of the foot on the lateral view. No foreign bodies are identified.       Medications - No data to display    Assessments & Plan (with Medical Decision Making)   42 year old male with no significant past medical history with complaint of laceration to dorsum of left foot detailed above.  Sensation is intact to light touch and circulation intact with skin is warm and good capillary refill.  Concern for possible injury to extensor tendon of the great toe.  Wound probed after local infiltration of anesthetic agents but unable to visualize tendon.  Wound repaired primarily with four 3-0 nylon sutures.  Orthopedic  referral placed for further evaluation and possible tendon repair. Wound care, follow up and ED return precautions discussed.       I have reviewed the nursing notes.         Discharge Medication List as of 5/21/2023  7:18 PM          Final diagnoses:   Laceration of left foot, initial encounter   Injury of tendon of foot     Fam Martin MD    5/21/2023   [facility] EMERGENCY DEPT    Disclaimer: This note consists of words and symbols derived from keyboarding and dictation using voice recognition software.  As a result, there may be errors that have gone undetected.  Please consider this when interpreting information found in this note.             Fam Martin MD  05/22/23  [id number]

## 2023-05-22 NOTE — PATIENT INSTRUCTIONS
For informational purposes only. Not to replace the advice of your health care provider. Copyright   2003,  [employer] [facility]. All rights reserved. Clinically reviewed by Carolyn Danielle MD. [place] [id number] - REV .  Preparing for Your Surgery  Getting started  A nurse will call you to review your health history and instructions. They will give you an arrival time based on your scheduled surgery time. Please be ready to share:  Your doctor's clinic name and phone number  Your medical, surgical, and anesthesia history  A list of allergies and sensitivities  A list of medicines, including herbal treatments and over-the-counter drugs  Whether the patient has a legal guardian (ask how to send us the papers in advance)  Please tell us if you're pregnant--or if there's any chance you might be pregnant. Some surgeries may injure a fetus (unborn baby), so they require a pregnancy test. Surgeries that are safe for a fetus don't always need a test, and you can choose whether to have one.   If you have a child who's having surgery, please ask for a copy of Preparing for Your Child's Surgery.    Preparing for surgery  Within 10 to 30 days of surgery: Have a pre-op exam (sometimes called an H&P, or History and Physical). This can be done at a clinic or pre-operative center.  If you're having a , you may not need this exam. Talk to your care team.  At your pre-op exam, talk to your care team about all medicines you take. If you need to stop any medicines before surgery, ask when to start taking them again.  We do this for your safety. Many medicines can make you bleed too much during surgery. Some change how well surgery (anesthesia) drugs work.  Call your insurance company to let them know you're having surgery. (If you don't have insurance, call [phone].)  Call your clinic if there's any change in your health. This includes signs of a cold or flu (sore throat, runny nose, cough, rash, fever). It  also includes a scrape or scratch near the surgery site.  If you have questions on the day of surgery, call your hospital or surgery center.  Eating and drinking guidelines  For your safety: Unless your surgeon tells you otherwise, follow the guidelines below.  Eat and drink as usual until 8 hours before you arrive for surgery. After that, no food or milk.  Drink clear liquids until 2 hours before you arrive. These are liquids you can see through, like water, Gatorade, and Propel Water. They also include plain black coffee and tea (no cream or milk), candy, and breath mints. You can spit out gum when you arrive.  If you drink alcohol: Stop drinking it the night before surgery.  If your care team tells you to take medicine on the morning of surgery, it's okay to take it with a sip of water.  Preventing infection  Shower or bathe the night before and morning of your surgery. Follow the instructions your clinic gave you. (If no instructions, use regular soap.)  Don't shave or clip hair near your surgery site. We'll remove the hair if needed.  Don't smoke or vape the morning of surgery. You may chew nicotine gum up to 2 hours before surgery. A nicotine patch is okay.  Note: Some surgeries require you to completely quit smoking and nicotine. Check with your surgeon.  Your care team will make every effort to keep you safe from infection. We will:  Clean our hands often with soap and water (or an alcohol-based hand rub).  Clean the skin at your surgery site with a special soap that kills germs.  Give you a special gown to keep you warm. (Cold raises the risk of infection.)  Wear special hair covers, masks, gowns and gloves during surgery.  Give antibiotic medicine, if prescribed. Not all surgeries need antibiotics.  What to bring on the day of surgery  Photo ID and insurance card  Copy of your health care directive, if you have one  Glasses and hearing aids (bring cases)  You can't wear contacts during surgery  Inhaler and  eye drops, if you use them (tell us about these when you arrive)  CPAP machine or breathing device, if you use them  A few personal items, if spending the night  If you have . . .  A pacemaker, ICD (cardiac defibrillator) or other implant: Bring the ID card.  An implanted stimulator: Bring the remote control.  A legal guardian: Bring a copy of the certified (court-stamped) guardianship papers.  Please remove any jewelry, including body piercings. Leave jewelry and other valuables at home.  If you're going home the day of surgery  You must have a responsible adult drive you home. They should stay with you overnight as well.  If you don't have someone to stay with you, and you aren't safe to go home alone, we may keep you overnight. Insurance often won't pay for this.  After surgery  If it's hard to control your pain or you need more pain medicine, please call your surgeon's office.  Questions?   If you have any questions for your care team, list them here: _________________________________________________________________________________________________________________________________________________________________________ ____________________________________ ____________________________________ ____________________________________    How to Take Your Medication Before Surgery  - Take all of your medications before surgery as usual

## 2023-05-22 NOTE — PROGRESS NOTES
"Order signed for \"repair of left extensor hallucis longus tendon\"    MAC    Supine    No vendor    Prince Ruiz DPM [facility] FACFAOM  Podiatric Foot & Ankle Surgeon  [facility]  [phone]      "

## 2023-05-24 ENCOUNTER — ANESTHESIA EVENT (OUTPATIENT)
Dept: SURGERY | Facility: CLINIC | Age: 43
End: 2023-05-24
Payer: COMMERCIAL

## 2023-05-24 ENCOUNTER — ANESTHESIA (OUTPATIENT)
Dept: SURGERY | Facility: CLINIC | Age: 43
End: 2023-05-24
Payer: COMMERCIAL

## 2023-05-24 ENCOUNTER — HOSPITAL ENCOUNTER (OUTPATIENT)
Facility: CLINIC | Age: 43
Discharge: HOME | End: 2023-05-24
Attending: PODIATRIST | Admitting: PODIATRIST
Payer: COMMERCIAL

## 2023-05-24 VITALS
WEIGHT: 236 LBS | HEART RATE: 68 BPM | HEIGHT: 73 IN | SYSTOLIC BLOOD PRESSURE: 125 MMHG | BODY MASS INDEX: 31.28 KG/M2 | TEMPERATURE: 97.2 F | OXYGEN SATURATION: 100 % | DIASTOLIC BLOOD PRESSURE: 95 MMHG | RESPIRATION RATE: 16 BRPM

## 2023-05-24 DIAGNOSIS — Z98.890 S/P FOOT SURGERY, LEFT: Primary | ICD-10-CM

## 2023-05-24 PROCEDURE — 250N000011 HC RX IP 250 OP 636: Performed by: PODIATRIST

## 2023-05-24 PROCEDURE — 250N000011 HC RX IP 250 OP 636: Performed by: NURSE ANESTHETIST, CERTIFIED REGISTERED

## 2023-05-24 PROCEDURE — 258N000003 HC RX IP 258 OP 636: Performed by: ANESTHESIOLOGY

## 2023-05-24 PROCEDURE — 28200 REPAIR OF FOOT TENDON: CPT | Mod: LT | Performed by: PODIATRIST

## 2023-05-24 PROCEDURE — 710N000012 HC RECOVERY PHASE 2, PER MINUTE: Performed by: PODIATRIST

## 2023-05-24 PROCEDURE — 272N000001 HC OR GENERAL SUPPLY STERILE: Performed by: PODIATRIST

## 2023-05-24 PROCEDURE — 370N000017 HC ANESTHESIA TECHNICAL FEE, PER MIN: Performed by: PODIATRIST

## 2023-05-24 PROCEDURE — 999N000141 HC STATISTIC PRE-PROCEDURE NURSING ASSESSMENT: Performed by: PODIATRIST

## 2023-05-24 PROCEDURE — 360N000076 HC SURGERY LEVEL 3, PER MIN: Performed by: PODIATRIST

## 2023-05-24 RX ORDER — ONDANSETRON 4 MG/1
4 TABLET, ORALLY DISINTEGRATING ORAL EVERY 30 MIN PRN
Status: DISCONTINUED | OUTPATIENT
Start: 2023-05-24 | End: 2023-05-24 | Stop reason: HOSPADM

## 2023-05-24 RX ORDER — CEFAZOLIN SODIUM/WATER 2 G/20 ML
2 SYRINGE (ML) INTRAVENOUS
Status: COMPLETED | OUTPATIENT
Start: 2023-05-24 | End: 2023-05-24

## 2023-05-24 RX ORDER — ONDANSETRON 2 MG/ML
INJECTION INTRAMUSCULAR; INTRAVENOUS PRN
Status: DISCONTINUED | OUTPATIENT
Start: 2023-05-24 | End: 2023-05-24

## 2023-05-24 RX ORDER — IBUPROFEN 600 MG/1
TABLET, FILM COATED ORAL
Qty: 30 TABLET | Refills: 0 | Status: SHIPPED | OUTPATIENT
Start: 2023-05-24 | End: 2023-06-12

## 2023-05-24 RX ORDER — DEXAMETHASONE SODIUM PHOSPHATE 4 MG/ML
4 INJECTION, SOLUTION INTRA-ARTICULAR; INTRALESIONAL; INTRAMUSCULAR; INTRAVENOUS; SOFT TISSUE
Status: DISCONTINUED | OUTPATIENT
Start: 2023-05-24 | End: 2023-05-24 | Stop reason: HOSPADM

## 2023-05-24 RX ORDER — FENTANYL CITRATE 50 UG/ML
INJECTION, SOLUTION INTRAMUSCULAR; INTRAVENOUS PRN
Status: DISCONTINUED | OUTPATIENT
Start: 2023-05-24 | End: 2023-05-24

## 2023-05-24 RX ORDER — SODIUM CHLORIDE, SODIUM LACTATE, POTASSIUM CHLORIDE, CALCIUM CHLORIDE 600; 310; 30; 20 MG/100ML; MG/100ML; MG/100ML; MG/100ML
INJECTION, SOLUTION INTRAVENOUS CONTINUOUS
Status: DISCONTINUED | OUTPATIENT
Start: 2023-05-24 | End: 2023-05-24 | Stop reason: HOSPADM

## 2023-05-24 RX ORDER — PROPOFOL 10 MG/ML
INJECTION, EMULSION INTRAVENOUS PRN
Status: DISCONTINUED | OUTPATIENT
Start: 2023-05-24 | End: 2023-05-24

## 2023-05-24 RX ORDER — FENTANYL CITRATE 50 UG/ML
25 INJECTION, SOLUTION INTRAMUSCULAR; INTRAVENOUS
Status: DISCONTINUED | OUTPATIENT
Start: 2023-05-24 | End: 2023-05-24 | Stop reason: HOSPADM

## 2023-05-24 RX ORDER — CEFAZOLIN SODIUM/WATER 2 G/20 ML
2 SYRINGE (ML) INTRAVENOUS SEE ADMIN INSTRUCTIONS
Status: DISCONTINUED | OUTPATIENT
Start: 2023-05-24 | End: 2023-05-24 | Stop reason: HOSPADM

## 2023-05-24 RX ORDER — DIAZEPAM 10 MG/2ML
2.5 INJECTION, SOLUTION INTRAMUSCULAR; INTRAVENOUS
Status: DISCONTINUED | OUTPATIENT
Start: 2023-05-24 | End: 2023-05-24 | Stop reason: HOSPADM

## 2023-05-24 RX ORDER — ALBUTEROL SULFATE 0.83 MG/ML
2.5 SOLUTION RESPIRATORY (INHALATION) EVERY 4 HOURS PRN
Status: DISCONTINUED | OUTPATIENT
Start: 2023-05-24 | End: 2023-05-24 | Stop reason: HOSPADM

## 2023-05-24 RX ORDER — FENTANYL CITRATE 50 UG/ML
50 INJECTION, SOLUTION INTRAMUSCULAR; INTRAVENOUS EVERY 5 MIN PRN
Status: DISCONTINUED | OUTPATIENT
Start: 2023-05-24 | End: 2023-05-24 | Stop reason: HOSPADM

## 2023-05-24 RX ORDER — HYDROXYZINE HYDROCHLORIDE 25 MG/1
TABLET, FILM COATED ORAL
Qty: 15 TABLET | Refills: 0 | Status: SHIPPED | OUTPATIENT
Start: 2023-05-24 | End: 2023-06-10

## 2023-05-24 RX ORDER — MEPERIDINE HYDROCHLORIDE 25 MG/ML
12.5 INJECTION INTRAMUSCULAR; INTRAVENOUS; SUBCUTANEOUS EVERY 5 MIN PRN
Status: DISCONTINUED | OUTPATIENT
Start: 2023-05-24 | End: 2023-05-24 | Stop reason: HOSPADM

## 2023-05-24 RX ORDER — LABETALOL HYDROCHLORIDE 5 MG/ML
10 INJECTION, SOLUTION INTRAVENOUS EVERY 10 MIN PRN
Status: DISCONTINUED | OUTPATIENT
Start: 2023-05-24 | End: 2023-05-24 | Stop reason: HOSPADM

## 2023-05-24 RX ORDER — BUPIVACAINE HYDROCHLORIDE 2.5 MG/ML
INJECTION, SOLUTION EPIDURAL; INFILTRATION; INTRACAUDAL PRN
Status: DISCONTINUED | OUTPATIENT
Start: 2023-05-24 | End: 2023-05-24 | Stop reason: HOSPADM

## 2023-05-24 RX ORDER — LIDOCAINE 40 MG/G
CREAM TOPICAL
Status: DISCONTINUED | OUTPATIENT
Start: 2023-05-24 | End: 2023-05-24 | Stop reason: HOSPADM

## 2023-05-24 RX ORDER — ONDANSETRON 2 MG/ML
4 INJECTION INTRAMUSCULAR; INTRAVENOUS EVERY 30 MIN PRN
Status: DISCONTINUED | OUTPATIENT
Start: 2023-05-24 | End: 2023-05-24 | Stop reason: HOSPADM

## 2023-05-24 RX ORDER — HYDROCODONE BITARTRATE AND ACETAMINOPHEN 5; 325 MG/1; MG/1
TABLET ORAL
Qty: 15 TABLET | Refills: 0 | Status: SHIPPED | OUTPATIENT
Start: 2023-05-24 | End: 2023-06-10

## 2023-05-24 RX ORDER — OXYCODONE HYDROCHLORIDE 5 MG/1
5 TABLET ORAL EVERY 4 HOURS PRN
Status: DISCONTINUED | OUTPATIENT
Start: 2023-05-24 | End: 2023-05-24 | Stop reason: HOSPADM

## 2023-05-24 RX ORDER — HYDROMORPHONE HCL IN WATER/PF 6 MG/30 ML
0.4 PATIENT CONTROLLED ANALGESIA SYRINGE INTRAVENOUS EVERY 5 MIN PRN
Status: DISCONTINUED | OUTPATIENT
Start: 2023-05-24 | End: 2023-05-24 | Stop reason: HOSPADM

## 2023-05-24 RX ORDER — HYDROCODONE BITARTRATE AND ACETAMINOPHEN 5; 325 MG/1; MG/1
1 TABLET ORAL
Status: DISCONTINUED | OUTPATIENT
Start: 2023-05-24 | End: 2023-05-24 | Stop reason: HOSPADM

## 2023-05-24 RX ORDER — OXYCODONE HYDROCHLORIDE 5 MG/1
10 TABLET ORAL EVERY 4 HOURS PRN
Status: DISCONTINUED | OUTPATIENT
Start: 2023-05-24 | End: 2023-05-24 | Stop reason: HOSPADM

## 2023-05-24 RX ADMIN — MIDAZOLAM 2 MG: 1 INJECTION INTRAMUSCULAR; INTRAVENOUS at 13:51

## 2023-05-24 RX ADMIN — FENTANYL CITRATE 25 MCG: 50 INJECTION, SOLUTION INTRAMUSCULAR; INTRAVENOUS at 14:22

## 2023-05-24 RX ADMIN — ONDANSETRON 4 MG: 2 INJECTION INTRAMUSCULAR; INTRAVENOUS at 14:06

## 2023-05-24 RX ADMIN — Medication 2 G: at 13:53

## 2023-05-24 RX ADMIN — FENTANYL CITRATE 25 MCG: 50 INJECTION, SOLUTION INTRAMUSCULAR; INTRAVENOUS at 14:06

## 2023-05-24 RX ADMIN — SODIUM CHLORIDE, POTASSIUM CHLORIDE, SODIUM LACTATE AND CALCIUM CHLORIDE: 600; 310; 30; 20 INJECTION, SOLUTION INTRAVENOUS at 14:45

## 2023-05-24 RX ADMIN — SODIUM CHLORIDE, POTASSIUM CHLORIDE, SODIUM LACTATE AND CALCIUM CHLORIDE: 600; 310; 30; 20 INJECTION, SOLUTION INTRAVENOUS at 13:53

## 2023-05-24 RX ADMIN — FENTANYL CITRATE 25 MCG: 50 INJECTION, SOLUTION INTRAMUSCULAR; INTRAVENOUS at 13:58

## 2023-05-24 RX ADMIN — PROPOFOL 200 MCG/KG/MIN: 10 INJECTION, EMULSION INTRAVENOUS at 14:03

## 2023-05-24 RX ADMIN — PROPOFOL 150 MG: 10 INJECTION, EMULSION INTRAVENOUS at 13:59

## 2023-05-24 ASSESSMENT — ACTIVITIES OF DAILY LIVING (ADL)
ADLS_ACUITY_SCORE: 35
ADLS_ACUITY_SCORE: 38

## 2023-05-24 NOTE — ANESTHESIA CARE TRANSFER NOTE
Patient: Mason Sin    Procedure: Procedure(s):  Repair of left extensor hallucis longus tendon       Diagnosis: Laceration of extensor tendon of left foot, initial encounter [S96.823G]  Diagnosis Additional Information: No value filed.    Anesthesia Type:   No value filed.     Note:      Level of Consciousness: awake  Oxygen Supplementation: room air    Independent Airway: airway patency satisfactory and stable  Dentition: dentition unchanged  Vital Signs Stable: post-procedure vital signs reviewed and stable  Report to RN Given: handoff report given  Patient transferred to: Phase II    Handoff Report: Identifed the Patient, Identified the Reponsible Provider, Reviewed the pertinent medical history, Discussed the surgical course, Reviewed Intra-OP anesthesia mangement and issues during anesthesia, Set expectations for post-procedure period and Allowed opportunity for questions and acknowledgement of understanding      Vitals:  Vitals Value Taken Time   /82 05/24/23 1502   Temp 97.4  F (36.3  C) 05/24/23 1500   Pulse 68 05/24/23 1502   Resp     SpO2 94 % 05/24/23 1503   Vitals shown include unvalidated device data.    Electronically Signed By: JEFF Lopez CRNA  May 24, 2023  3:04 PM

## 2023-05-24 NOTE — DISCHARGE INSTRUCTIONS
[facility] ADULT DISCHARGE INSTRUCTIONS   SPECIAL PRECAUTIONS FOR 24 HOURS AFTER SURGERY    IT IS NOT UNUSUAL TO FEEL LIGHT-HEADED OR FAINT, UP TO 24 HOURS AFTER SURGERY OR WHILE TAKING PAIN MEDICATION.  IF YOU HAVE THESE SYMPTOMS; SIT FOR A FEW MINUTES BEFORE STANDING AND HAVE SOMEONE ASSIST YOU WHEN YOU GET UP TO WALK OR USE THE BATHROOM.    YOU SHOULD REST AND RELAX FOR THE NEXT 24 HOURS AND YOU MUST MAKE ARRANGEMENTS TO HAVE SOMEONE STAY WITH YOU FOR AT LEAST 24 HOURS AFTER YOUR DISCHARGE.  AVOID HAZARDOUS AND STRENUOUS ACTIVITIES.  DO NOT MAKE IMPORTANT DECISIONS FOR 24 HOURS.    DO NOT DRIVE ANY VEHICLE OR OPERATE MECHANICAL EQUIPMENT FOR 24 HOURS FOLLOWING THE END OF YOUR SURGERY.  EVEN THOUGH YOU MAY FEEL NORMAL, YOUR REACTIONS MAY BE AFFECTED BY THE MEDICATION YOU HAVE RECEIVED.    DO NOT DRINK ALCOHOLIC BEVERAGES FOR 24 HOURS FOLLOWING YOUR SURGERY.    DRINK CLEAR LIQUIDS (APPLE JUICE, GINGER ALE, 7-UP, BROTH, ETC.).  PROGRESS TO YOUR REGULAR DIET AS YOU FEEL ABLE.    YOU MAY HAVE A DRY MOUTH, A SORE THROAT, MUSCLES ACHES OR TROUBLE SLEEPING.  THESE SHOULD GO AWAY AFTER 24 HOURS.    CALL YOUR DOCTOR FOR ANY OF THE FOLLOWING:  SIGNS OF INFECTION (FEVER, GROWING TENDERNESS AT THE SURGERY SITE, A LARGE AMOUNT OF DRAINAGE OR BLEEDING, SEVERE PAIN, FOUL-SMELLING DRAINAGE, REDNESS OR SWELLING.    IT HAS BEEN OVER 8 TO 10 HOURS SINCE SURGERY AND YOU ARE STILL NOT ABLE TO URINATE (PASS WATER).      Maximum acetaminophen (Tylenol) dose from all sources should not exceed 4 grams (4000 mg) per day.  Each Norco contains 325 mg of Tylenol.

## 2023-05-24 NOTE — BRIEF OP NOTE
[facility]    Brief Operative Note    Pre-operative diagnosis: Laceration of extensor tendon of left foot, initial encounter [B78.230B]  Post-operative diagnosis sp end-to-end primary repair of lacerated left Extensor Hallucis Longus tendon    Procedure: Procedure(s):  Repair of left extensor hallucis longus tendon  Surgeon: Surgeon(s) and Role:     * Prince Ruiz DPM - Primary  Anesthesia: MAC   Estimated Blood Loss: Minimal    Drains: None  Specimens: * No specimens in log *  Findings:   stable end-to-end repair of lacerated left EHL.  Complications: None.  Implants: * No implants in log *

## 2023-05-24 NOTE — ANESTHESIA POSTPROCEDURE EVALUATION
Patient: Mason Sin    Procedure: Procedure(s):  Repair of left extensor hallucis longus tendon       Anesthesia Type:  No value filed.    Note:     Postop Pain Control: Uneventful            Sign Out: Well controlled pain   PONV: No   Neuro/Psych: Uneventful            Sign Out: Acceptable/Baseline neuro status   Airway/Respiratory: Uneventful            Sign Out: Acceptable/Baseline resp. status   CV/Hemodynamics: Uneventful            Sign Out: Acceptable CV status   Other NRE: NONE   DID A NON-ROUTINE EVENT OCCUR? No           Last vitals:  Vitals Value Taken Time   /95 05/24/23 1547   Temp 97.2  F (36.2  C) 05/24/23 1545   Pulse 47 05/24/23 1547   Resp 16 05/24/23 1545   SpO2 100 % 05/24/23 1548   Vitals shown include unvalidated device data.    Electronically Signed By: Abraham Barraza MD  May 24, 2023  4:01 PM

## 2023-05-24 NOTE — OP NOTE
Procedure Date: 05/24/2023    SURGEON:  Prince Ruiz DPM    PREOPERATIVE DIAGNOSES:  Left extensor hallucis longus tendon laceration.    POSTOPERATIVE DIAGNOSIS:  Left extensor hallucis longus tendon laceration.    PROCEDURE:  Primary end-to-end repair of the left extensor hallucis longus tendon.    ANESTHESIA:  MAC with local.    HEMOSTASIS/ESTIMATED BLOOD LOSS:  Less than 5 mL.    MATERIALS:    1.  2-0 FiberWire.  2.  0 FiberWire.    INJECTABLES:  15 mL of 0.25% bupivacaine plain preoperatively.    COMPLICATIONS:  None apparent.    INDICATIONS FOR PROCEDURE:  The patient is a pleasant 42-year-old male who was referred to our office after sustaining a laceration to the left extensor hallucis longus tendon at the dorsal mid foot.  He was cleaning up after a project utilizing sheet metal when a piece fell on his foot.  He was seen in the Emergency Department where there was clear droop to the left great toe.  The wound was washed out and primarily closed in the Emergency Department, and he followed up in clinic.  I ordered an MRI that showed a full thickness EHL laceration with a 2 cm gap.  We discussed end-to-end repair versus cadaveric grafting versus side-to-side anastomosis options.    DESCRIPTION OF PROCEDURE:  After obtaining written consent, the patient was transferred to the operating room and placed in the supine position on the operating room table.  IV sedation was initiated.  The foot was anesthetized with a preoperative local.  It was then prepped and draped in normal aseptic fashion.  No tourniquet was utilized as I had concerns that this may limit extensor hallucis longus proximal stump translation.  The patient, procedure, and site were correctly identified by OR staff.    PROCEDURE 1:  Left extensor hallucis longus tendon repair:  Attention was directed to the sutures that were placed at the laceration site, and these were removed.  A lazy S incision was drawn out involving the laceration  site.  This was carried down to subcutaneous tissue.  Bleeding vessels were electrocauterized and hand tied as necessary.  Blunt dissection was used to carry the incision down to the deep fascia.  The proximal and distal stumps were identified.  An interlocking 2-0 FiberWire stitch was placed in the proximal stump with FiberWire tags hanging distally.  An interlocking 2-0 FiberWire stitch was placed in the distal stump with the FiberWire tags hanging proximally.  With the hallux dorsiflexed, the end-to-end repair was completed with hand tying of the 2-0 FiberWire tags.  I then utilized 0 FiberWire and did simple interrupted stitches perpendicular to the laceration site for augmentation of the repair.  The wound was then flushed with copious amounts of normal saline.  Layered closure was performed with 4-0 Vicryl and 4-0 nylon.    A dry sterile dressing was applied to the patient's left foot.  This was done with the hallux splinted in a dorsiflexed position to minimize tension on the repair site.  The patient was transferred to the PACU with vital signs stable and vascular status intact.  The patient will be touchdown weightbearing in a boot with crutches and will follow up with me in clinic in approximately 1 week or sooner with any acute issues.    Prince Ruiz DPM        D: 2023   T: 2023   MT: LA    Name:     FRANCISCA CHARLES  MRN:      [id number]        Account:        [id number]   :      1980           Procedure Date: 2023     Document: [id number]

## 2023-05-24 NOTE — ANESTHESIA PREPROCEDURE EVALUATION
Anesthesia Pre-Procedure Evaluation    Patient: Mason Sin   MRN: [id number] : 1980        Procedure : Procedure(s):  Repair of left extensor hallucis longus tendon          Past Medical History:   Diagnosis Date     NO ACTIVE PROBLEMS       Past Surgical History:   Procedure Laterality Date     NO HISTORY OF SURGERY        No Known Allergies   Social History     Tobacco Use     Smoking status: Never     Smokeless tobacco: Never   Vaping Use     Vaping status: Never Used   Substance Use Topics     Alcohol use: Not Currently     Comment: occ      Wt Readings from Last 1 Encounters:   23 107 kg (236 lb)        Anesthesia Evaluation   Pt has had prior anesthetic. Type: General.    No history of anesthetic complications       ROS/MED HX  ENT/Pulmonary:  - neg pulmonary ROS     Neurologic:  - neg neurologic ROS     Cardiovascular:  - neg cardiovascular ROS     METS/Exercise Tolerance:     Hematologic:  - neg hematologic  ROS     Musculoskeletal:  - neg musculoskeletal ROS     GI/Hepatic:  - neg GI/hepatic ROS     Renal/Genitourinary:  - neg Renal ROS     Endo:  - neg endo ROS     Psychiatric/Substance Use:  - neg psychiatric ROS     Infectious Disease:  - neg infectious disease ROS     Malignancy:  - neg malignancy ROS     Other:  - neg other ROS          Physical Exam    Airway        Mallampati: I   TM distance: > 3 FB   Neck ROM: full   Mouth opening: > 3 cm    Respiratory Devices and Support         Dental           Cardiovascular   cardiovascular exam normal          Pulmonary   pulmonary exam normal                OUTSIDE LABS:  CBC:   Lab Results   Component Value Date    WBC 6.2 2023    WBC 7.4 2021    HGB 15.7 2023    HGB 16.4 2021    HCT 45.8 2023    HCT 48.2 2021     2023     2021     BMP:   Lab Results   Component Value Date     2023     2021    POTASSIUM 4.1 2023    POTASSIUM 4.0 2021     CHLORIDE 105 05/22/2023    CHLORIDE 105 11/09/2021    CO2 21 (L) 05/22/2023    CO2 26 11/09/2021    BUN 18.8 05/22/2023    BUN 14 11/09/2021    CR 1.02 05/22/2023    CR 1.08 11/09/2021    GLC 97 05/22/2023    GLC 95 11/09/2021     COAGS: No results found for: PTT, INR, FIBR  POC: No results found for: BGM, HCG, HCGS  HEPATIC:   Lab Results   Component Value Date    ALBUMIN 3.9 11/09/2021    PROTTOTAL 7.5 11/09/2021    ALT 26 11/09/2021    AST 20 11/09/2021    ALKPHOS 47 11/09/2021    BILITOTAL 0.7 11/09/2021     OTHER:   Lab Results   Component Value Date    KELSIE 9.3 05/22/2023    TSH 1.37 11/09/2021       Anesthesia Plan    ASA Status:  1   NPO Status:  NPO Appropriate    Anesthesia Type: MAC.              Consents    Anesthesia Plan(s) and associated risks, benefits, and realistic alternatives discussed. Questions answered and patient/representative(s) expressed understanding.    - Discussed:     - Discussed with:  Patient         Postoperative Care    Pain management: IV analgesics, Oral pain medications, Multi-modal analgesia.   PONV prophylaxis: Ondansetron (or other 5HT-3), Dexamethasone or Solumedrol     Comments:                Abraham Barraza MD

## 2023-05-30 ENCOUNTER — OFFICE VISIT (OUTPATIENT)
Dept: PODIATRY | Facility: CLINIC | Age: 43
End: 2023-05-30
Payer: COMMERCIAL

## 2023-05-30 VITALS — DIASTOLIC BLOOD PRESSURE: 82 MMHG | BODY MASS INDEX: 31.14 KG/M2 | WEIGHT: 236 LBS | SYSTOLIC BLOOD PRESSURE: 130 MMHG

## 2023-05-30 DIAGNOSIS — S96.822A LACERATION OF EXTENSOR TENDON OF LEFT FOOT, INITIAL ENCOUNTER: Primary | ICD-10-CM

## 2023-05-30 DIAGNOSIS — Z98.890 S/P FOOT SURGERY, LEFT: ICD-10-CM

## 2023-05-30 PROCEDURE — 99024 POSTOP FOLLOW-UP VISIT: CPT | Performed by: PODIATRIST

## 2023-05-30 NOTE — PATIENT INSTRUCTIONS
Thank you for choosing [facility]!    DR. PARKER'S CLINIC LOCATIONS:     [facility] (Friday) TRIAGE LINE: [phone] [address] [facility]  APPOINTMENTS: [phone]   [place] [address] RADIOLOGY: [phone]    PHYSICAL THERAPY: [phone]    SET UP SURGERY: [phone]   [place] (Mon-Tues AM-Thurs) BILLING QUESTIONS: [phone]   [address] FAX: [phone]   [place] [id number]        Follow up in 1 week for reassessment and likely suture removal.

## 2023-05-30 NOTE — LETTER
5/30/2023         RE: Mason Sin  [id number]  [facility] [address]        Dear Colleague,    Thank you for referring your patient, Mason Sin, to the [facility] PODI[facility]RY. Please see a copy of my visit note below.    Foot & Ankle Surgery  May 30, 2023    S:  Patient in today sp Primary end-to-end repair of the left extensor hallucis longus tendon on 5/24/23.  Pain levels minimal.  NWB with occasional touch-down WB.  Following post-op instructions    /82   Wt 107 kg (236 lb)   BMI 31.14 kg/m        ROS - positive for CC.  Patient denies current nausea, vomiting, chills, fevers, belly pain, calf pain, chest pain or SOB.  Complete remainder of ROS is otherwise neg.    PE -sutures intact, skin margins are well coapted.  The hallux is sitting parallel to the second toe and sagittal plane.  There is active extension noted..  Skin shows no trophic, color or temperature changes otherwise.  No calf redness, swelling or pain noted otherwise.      A/P - 42 year old yo patient approx 1 week sp above procedure  -We reviewed the procedure and intraoperative findings  -New dressing was applied with the hallux splinted in mild dorsiflexion  -Continue all postoperative instructions without change; reviewed  -No pain medication refill request    Follow up  -1 week for likely suture removal or sooner with acute issues    Plan for [id number] -once healed sufficiently to resume job duties      Prince Ruiz DPM FACFAS FACFAOM  Podiatric Foot & Ankle Surgeon  [facility]  [phone]    Disclaimer: This note consists of symbols derived from keyboarding, dictation and/or voice recognition software. As a result, there may be errors in the script that have gone undetected. Please consider this when interpreting information found in this chart.          Again, thank you for allowing me to participate in the care of your patient.        Sincerely,        Prince  Sara, MARCELLOM, DPELIUD

## 2023-05-30 NOTE — PROGRESS NOTES
Foot & Ankle Surgery  May 30, 2023    S:  Patient in today sp Primary end-to-end repair of the left extensor hallucis longus tendon on 5/24/23.  Pain levels minimal.  NWB with occasional touch-down WB.  Following post-op instructions    /82   Wt 107 kg (236 lb)   BMI 31.14 kg/m        ROS - positive for CC.  Patient denies current nausea, vomiting, chills, fevers, belly pain, calf pain, chest pain or SOB.  Complete remainder of ROS is otherwise neg.    PE -sutures intact, skin margins are well coapted.  The hallux is sitting parallel to the second toe and sagittal plane.  There is active extension noted..  Skin shows no trophic, color or temperature changes otherwise.  No calf redness, swelling or pain noted otherwise.      A/P - 42 year old yo patient approx 1 week sp above procedure  -We reviewed the procedure and intraoperative findings  -New dressing was applied with the hallux splinted in mild dorsiflexion  -Continue all postoperative instructions without change; reviewed  -No pain medication refill request    Follow up  -1 week for likely suture removal or sooner with acute issues    Plan for [id number] -once healed sufficiently to resume job duties      Prince Ruiz DPM FACFAS FACFAOM  Podiatric Foot & Ankle Surgeon  [facility]  [phone]    Disclaimer: This note consists of symbols derived from keyboarding, dictation and/or voice recognition software. As a result, there may be errors in the script that have gone undetected. Please consider this when interpreting information found in this chart.

## 2023-06-01 ENCOUNTER — HEALTH MAINTENANCE LETTER (OUTPATIENT)
Age: 43
End: 2023-06-01

## 2023-06-06 ENCOUNTER — OFFICE VISIT (OUTPATIENT)
Dept: PODIATRY | Facility: CLINIC | Age: 43
End: 2023-06-06
Payer: COMMERCIAL

## 2023-06-06 VITALS — WEIGHT: 236 LBS | BODY MASS INDEX: 31.28 KG/M2 | HEIGHT: 73 IN

## 2023-06-06 DIAGNOSIS — S96.822A LACERATION OF EXTENSOR TENDON OF LEFT FOOT, INITIAL ENCOUNTER: Primary | ICD-10-CM

## 2023-06-06 DIAGNOSIS — Z98.890 S/P FOOT SURGERY, LEFT: ICD-10-CM

## 2023-06-06 PROCEDURE — 99024 POSTOP FOLLOW-UP VISIT: CPT | Performed by: PODIATRIST

## 2023-06-06 RX ORDER — CEPHALEXIN 500 MG/1
500 CAPSULE ORAL 2 TIMES DAILY
Qty: 14 CAPSULE | Refills: 0 | Status: SHIPPED | OUTPATIENT
Start: 2023-06-06 | End: 2023-06-13

## 2023-06-06 NOTE — PROGRESS NOTES
Foot & Ankle Surgery  June 6, 2023    S:  Patient in today sp primary end-to-end repair of the left extensor hallucis longus tendon on 5/24/2023.  Pain levels minimal.  There is mild erythema just distal to the incision.  The patient thinks this is secondary to irritation from the Coban splint    There were no vitals taken for this visit.      ROS - positive for CC.  Patient denies current nausea, vomiting, chills, fevers, belly pain, calf pain, chest pain or SOB.  Complete remainder of ROS is otherwise neg.    PE -sutures intact, skin margins well coapted.  There is a small superficial blister at the lateral aspect the distal portion of the incision.  Patient also has mild erythema distal lateral to the incision, focused over the second MPJ..  Skin shows no trophic, color or temperature changes otherwise.  No calf redness, swelling or pain noted otherwise.      A/P - 42 year old yo patient approx 2 weeks sp above procedure  -Sutures were left intact  -The patient was given a prescription for Keflex 500 mg twice daily x7 days  -Continue resting, icing, elevating to help facilitate incision healing  -We discussed weightbearing status.  He has his peg leg weightbearing assistive device.  Occasional bearing weight flat on the foot I think is very reasonable, especially as symptoms allow  -The patient will do passive and active range of motion exercises 3 times daily and we will likely proceed with formal PT at the 6-week postop appointment    Follow up  -1 week or sooner with acute issues    Plan for [id number] -once healed sufficiently to resume job duties      Prince Ruiz DPM FACFAS FACFAOM  Podiatric Foot & Ankle Surgeon  [facility]  [phone]    Disclaimer: This note consists of symbols derived from keyboarding, dictation and/or voice recognition software. As a result, there may be errors in the script that have gone undetected. Please consider this when interpreting information found  in this chart.

## 2023-06-10 ENCOUNTER — OFFICE VISIT (OUTPATIENT)
Dept: URGENT CARE | Facility: URGENT CARE | Age: 43
End: 2023-06-10
Payer: COMMERCIAL

## 2023-06-10 VITALS
SYSTOLIC BLOOD PRESSURE: 124 MMHG | HEART RATE: 53 BPM | TEMPERATURE: 97.3 F | WEIGHT: 230 LBS | OXYGEN SATURATION: 98 % | HEIGHT: 73 IN | DIASTOLIC BLOOD PRESSURE: 84 MMHG | RESPIRATION RATE: 20 BRPM | BODY MASS INDEX: 30.48 KG/M2

## 2023-06-10 DIAGNOSIS — Z48.89 ENCOUNTER FOR POSTOPERATIVE WOUND CHECK: Primary | ICD-10-CM

## 2023-06-10 PROCEDURE — 99213 OFFICE O/P EST LOW 20 MIN: CPT

## 2023-06-10 RX ORDER — ACETAMINOPHEN 500 MG
500-1000 TABLET ORAL EVERY 6 HOURS PRN
Status: ON HOLD | COMMUNITY
End: 2023-06-19

## 2023-06-10 ASSESSMENT — PAIN SCALES - GENERAL: PAINLEVEL: NO PAIN (0)

## 2023-06-10 NOTE — PATIENT INSTRUCTIONS
Diagnosis: post surgical wound check   Today we did:  Assessment     Plan:   Continue with antibiotic   Follow up with surgeon at schedule apt in two days 6/12/23    Monitor for:   Pus colored drainage   Warmth   Increased redness and swelling

## 2023-06-10 NOTE — PROGRESS NOTES
URGENT CARE  Assessment & Plan   Assessment:   Mason Sin is a 42 year old male who's clinical presentation today is consistent with:   1. Encounter for postoperative wound check    No alarm signs or symptoms present   Differential Diagnoses for this patient's CC include    Cellulitis, Erysipelas, abscess, sepsis    Atopic dermatitis, allergic Dermatitis, contact dermatitis    Postop wound infection, drug reaction,     Necrotizing fasciitis, DVT    Plan:  Patient is well appearing, afebrile, had reassuring vital signs and a benign physical exam. Given post op wound is without new signs of infeciton will encourage patient to continue to closely monitor symptoms and additionally treat them at this time with supportive and symptomatic measures, which include: finishing his antibiotic prescription ( two days left) and keeping his post surgical follow up he has in two days with surgeon on 6/12/23.   Provided reassurance that exam was normal today, discussed with patient the Return Precautions, they state they understand these and given the patient appears reasonable and reliable this is an appropriate since he has a visit scheduled with surgeon in two days.     Patient  is agreeable to treatment plan and state they will follow-up if symptoms do not improve and/or if symptoms worsen (see patient's AVS 'monitor for' section for specific patient instructions given and discussed regarding what to watch for and when to follow up)    Medications ordered are listed above, please see AVS for patient's specific and personalized discharge instructions given     JEFF Chaparro [facility] CARE [place]      ______________________________________________________________________        Subjective  Subjective     HPI: Mason Sin  is a 42 year old  male who presents today for evaluation the following concerns:   Patient presents today follow surgery he had on 5/24/23, two weeks ago for a laceration of the  "extensor tendon of the left foot.  surgery was performed by Sara   Patient was placed on a seven day course of keflex, starting on 6/6/23, for possible post-op site infection, patient states he has two days left and will finish the antibiotic on 6/12/23, patient also notes he has a follow up scheduled on 6/12/23 with his surgeon. Patient presents today to have his wound looked at as their is increased pain at the site along with some oozing of clear/bloody fluid.   Patient denies any warmth to the site, any pus colored drainage. Patient denies any red streaks or spreading redness, patient denies any new swelling.   Patient states since he has been on the antibiotic the redness and edema have gone down, he states he is here b/c the pain and spot of drainage.     No Known Allergies  Patient Active Problem List   Diagnosis     TMJ (temporomandibular joint syndrome)     CARDIOVASCULAR SCREENING; LDL GOAL LESS THAN 160     Recurrent herpes simplex     Strain of lumbar paraspinous muscle, initial encounter       Review of Systems:  Pertinent review of systems as reflected in HPI, otherwise negative.     Objective  Objective    Physical Exam:  Vitals:    06/10/23 1416   BP: 124/84   Pulse: 53   Resp: 20   Temp: 97.3  F (36.3  C)   TempSrc: Tympanic   SpO2: 98%   Weight: 104.3 kg (230 lb)   Height: 1.854 m (6' 1\")      General:   alert and oriented, no acute distress, nonill-appearing   Vital signs reviewed: afebrile and normotensive     Psy/mental status: pleasant, anxious   Msk:   Left}:foot:   Laceration with sutures noted on anterior  aspect of left foot.    3mm gap between sutures with noted serosanguinous drainage,   No abscess, no pus colored drainage, no signs of infection.    no  erythremia, ecchymosis, bruising, or  inflammation present surrounding  laceration.   Increased tenderness/pain with palpation   No  decreased range of motion with dorsiflexion, plantar flexion, inversion, and  eversion. "   Temperature equal} to body temperature. Neurovascularly intact distally with pulse +2. no crepitus, no gross deformity       I explained my diagnostic considerations and recommendations to the patient, who voiced understanding and agreement with the treatment plan.   All questions were answered.   We discussed potential side effects, risks and benefits of any prescribed or recommended therapies, as well as expectations for response to treatments.  Please see AVS for any patient instructions & handouts given.   Patient was advised to contact the Nurse Care Line, their Primary Care provider, Urgent Care, or the Emergency Department if there are new or worsening symptoms, or call 911 for emergencies.        ______________________________________________________________________          Patient Instructions   Diagnosis: post surgical wound check   Today we did:  Assessment     Plan:     Continue with antibiotic     Follow up with surgeon at schedule apt in two days 6/12/23    Monitor for:     Pus colored drainage     Warmth     Increased redness and swelling

## 2023-06-12 ENCOUNTER — OFFICE VISIT (OUTPATIENT)
Dept: PODIATRY | Facility: CLINIC | Age: 43
End: 2023-06-12
Payer: COMMERCIAL

## 2023-06-12 VITALS
HEIGHT: 73 IN | SYSTOLIC BLOOD PRESSURE: 130 MMHG | OXYGEN SATURATION: 97 % | HEART RATE: 78 BPM | WEIGHT: 230 LBS | BODY MASS INDEX: 30.48 KG/M2 | DIASTOLIC BLOOD PRESSURE: 91 MMHG

## 2023-06-12 DIAGNOSIS — T81.89XA NON-HEALING SURGICAL WOUND, INITIAL ENCOUNTER: Primary | ICD-10-CM

## 2023-06-12 DIAGNOSIS — T81.49XA SURGICAL SITE INFECTION: ICD-10-CM

## 2023-06-12 PROCEDURE — 99024 POSTOP FOLLOW-UP VISIT: CPT | Performed by: PODIATRIST

## 2023-06-12 PROCEDURE — 11042 DBRDMT SUBQ TIS 1ST 20SQCM/<: CPT | Mod: 58 | Performed by: PODIATRIST

## 2023-06-12 RX ORDER — CEPHALEXIN 500 MG/1
500 CAPSULE ORAL 2 TIMES DAILY
Qty: 14 CAPSULE | Refills: 0 | Status: SHIPPED | OUTPATIENT
Start: 2023-06-12 | End: 2023-06-19

## 2023-06-12 NOTE — PROGRESS NOTES
"Foot & Ankle Surgery  June 12, 2023    S:  Patient in today sp end-to-end left extensor hallucis longus tendon repair 5/24/2023.  Pain levels were elevated.  He was last seen on 6/6/2023 at which point he was noted to have Mild erythema.  He was started on Keflex but shortly thereafter, he was developing increasing pain and was seen at an outside urgent care.  At urgent care, he was noted to have worsening blistering and drainage but his antibiotics were not extended.  He did have 1 episode of diarrhea secondary to the antibiotics but has been taking a probiotic    BP (!) 130/91   Pulse 78   Ht 1.854 m (6' 1\")   Wt 104.3 kg (230 lb)   SpO2 97%   BMI 30.34 kg/m        ROS - positive for CC.  Patient denies current nausea, vomiting, chills, fevers, belly pain, calf pain, chest pain or SOB.  Complete remainder of ROS is otherwise neg.    PE -there is significant blistering and seropurulent drainage across the top of the foot.  Upon debridement of the blister, the underlying skin was noted to be healthy and intact.  However, the central area of the incision, the previous laceration, shows full-thickness necrosis and dehiscence down to and including subcu fat.  I do not see any extensor tendon or deep fascia.  There is mild drooping of the hallux compared to the previous examination.  There is active extension of the toe although this is limited.  Skin shows no trophic, color or temperature changes otherwise.  No calf redness, swelling or pain noted otherwise.    A/P - 42 year old yo patient approx 3 weeks sp above procedure  -Excisional debridement was performed, full-thickness, sharply debriding the wound down to and including exposed sub-cutaneous tissue/fat, excising nonviable tissue to the above dimensions with a tissue nipper and 15 blade, and the dorsal blister was deroofed and the underlying seropurulent drainage was evacuated.  -Remaining sutures were left intact  -A Betadine soaked gauze was applied to the " wound and a gauze/Ace bandage were applied.  Keep this intact until Thursday at which point he will follow-up with me for reassessment.  -We discussed that if the extensor tendon is noted at the base of the wound, surgical I&D would be necessary.   -We will extend his Keflex x1 week.  Encouraged him to continue the probiotic    Follow up  -  6/15/23 or sooner with acute issues    Plan for [id number] - once healed sufficiently       Prince Ruiz DPM FACFAS FACFAOM  Podiatric Foot & Ankle Surgeon  [facility]  [phone]    Disclaimer: This note consists of symbols derived from keyboarding, dictation and/or voice recognition software. As a result, there may be errors in the script that have gone undetected. Please consider this when interpreting information found in this chart.

## 2023-06-15 ENCOUNTER — OFFICE VISIT (OUTPATIENT)
Dept: PODIATRY | Facility: CLINIC | Age: 43
End: 2023-06-15
Payer: COMMERCIAL

## 2023-06-15 ENCOUNTER — TELEPHONE (OUTPATIENT)
Dept: PODIATRY | Facility: CLINIC | Age: 43
End: 2023-06-15

## 2023-06-15 ENCOUNTER — PREP FOR PROCEDURE (OUTPATIENT)
Dept: PODIATRY | Facility: CLINIC | Age: 43
End: 2023-06-15

## 2023-06-15 VITALS — WEIGHT: 230 LBS | HEIGHT: 73 IN | BODY MASS INDEX: 30.48 KG/M2

## 2023-06-15 DIAGNOSIS — S91.309A NONHEALING WOUND OF HEEL: Primary | ICD-10-CM

## 2023-06-15 DIAGNOSIS — T81.89XA NON-HEALING SURGICAL WOUND, INITIAL ENCOUNTER: Primary | ICD-10-CM

## 2023-06-15 PROCEDURE — 99024 POSTOP FOLLOW-UP VISIT: CPT | Performed by: PODIATRIST

## 2023-06-15 PROCEDURE — 11043 DBRDMT MUSC&/FSCA 1ST 20/<: CPT | Mod: 58 | Performed by: PODIATRIST

## 2023-06-15 RX ORDER — CEPHALEXIN 500 MG/1
500 CAPSULE ORAL 2 TIMES DAILY
Qty: 14 CAPSULE | Refills: 0 | Status: SHIPPED | OUTPATIENT
Start: 2023-06-15 | End: 2023-06-22

## 2023-06-15 NOTE — PROGRESS NOTES
"Order signed for \"incision and drainage left foot\"    MAC    Supine    No vendor      Prince Ruiz DPM FACFAS FACFAOM  Podiatric Foot & Ankle Surgeon  [facility]  [phone]      "

## 2023-06-15 NOTE — TELEPHONE ENCOUNTER
Patient has been scheduled for surgery. Details are below.    Date of Surgery: 06/19/23   Approximate Arrival Time: 945am    Surgeon: Dr. Prince Ruiz    Procedure: INCISION AND DRAINAGE LEFT FOOT (Left)     Location: [facility],  [address] [place] [address]  PostOp: 06/26/23  Packet Mailed/MyChart Sent: called  Added to [place]: yes

## 2023-06-15 NOTE — PATIENT INSTRUCTIONS
"Thank you for choosing [facility] Podiatry / Foot & Ankle Surgery!    DR. APRKER'S CLINIC LOCATIONS:     [facility] (Friday) TRIAGE LINE: [phone] [address] [facility]  APPOINTMENTS: [phone]   [place] [address] RADIOLOGY: [phone]    PHYSICAL THERAPY: [phone]    SET UP SURGERY: [phone]   [facility] (Mon-Tues AM-Thurs) BILLING QUESTIONS: [phone]   [address] FAX: [phone]   [place] [id number]         FOOT & ANKLE SURGERY PLANNING CHECKLIST  If you have decided to have surgery, follow these steps to get the procedure scheduled and to have the proper paperwork filled out. If you are unsure about surgery or would like to sit down and further discuss your issue and treatment options, please make an appointment.    1.  Pick the date that you would like to have surgery. Surgery is done on a Wednesday at [facility]. Keep in mind that you will likely need at least 2 weeks off after the procedure for proper rest and healing.    2.  Call the surgery scheduling line at [phone] to get the procedure scheduled. Our  will also help you make your pre-operative physical with a primary doctor, your surgery consult appointment with me and your one week follow up after surgery for your first dressing change.    3.  If our surgery scheduler does not make your surgery consultation appointment with me then call to schedule that. When making the appointment, say \"I need to make a 30 minute surgical consult appointment\".     4. Contact your employer to request time off from work if needed. If there is going to be FMLA used, please have them fax the forms to [phone] at least one week prior to surgery.    * If you have any post-operative questions regarding your procedure, call our triage team at the [facility] at [phone].   "

## 2023-06-15 NOTE — PROGRESS NOTES
"Foot & Ankle Surgery  Ally 15, 2023    S:  Patient in today sp repair of left EHL tendon on 5/24/23.  Pain levels low.  In today for recheck for necrotic nonhealing wound    Ht 1.854 m (6' 1\")   Wt 104.3 kg (230 lb)   BMI 30.34 kg/m        ROS - positive for CC.  Patient denies current nausea, vomiting, chills, fevers, belly pain, calf pain, chest pain or SOB.  Complete remainder of ROS is otherwise neg.    PE - erythema resolved, but central hypergranulation tissue present.  Upon debridement, necrotic tendon is noted, although no purulence is seen.  There is minimal extensor function currently and the toe is drooping compared to adjacent lesser toes, similar to his appointment earlier in the week.  Skin shows no trophic, color or temperature changes otherwise.  No calf redness, swelling or pain noted otherwise.    A/P - 42 year old yo patient approx 3 weeks sp above procedure  -Excisional debridement was performed, full-thickness, sharply debriding the wound down to and including exposed tendon, excising nonviable tissue to the above dimensions with a tissue nipper  -The patient has antibiotics until next Monday.  He was given another 1 week course of antibiotics  -Our plan is to revise the procedure with a trip back to the operating room on Monday, June 19 at [facility].  I anticipate needing to remove the extensor tendon if there is necrotic tendon at the base of the wound.  If the base the wound otherwise appears healthy, anticipate closing this up and allowing this to heal.  Options moving forward will depend on the clinical presentation of the toe.  With no extensor tendon, the flexor tendon will be working unopposed and would be expected to see further plantarflexion displacement of the toe.  However, if this does not cause any functional deficits, simply monitoring would be an option.  We also discussed the options for cadaver tendon replacement versus first MPJ fusion.  Either option would obviously " require full elimination of the infection prior to proceeding with reconstruction.  -The surgery order was signed.  The patient will reach out to his PCP to determine if a preop H&P is necessary.  This should be within the 30-day window of the original H&P however    Follow up  -  Monday or sooner with acute issues    Plan for [id number]-[id number] - once healed sufficiently       Prince Ruiz DPM [facility] FACFA  Podiatric Foot & Ankle Surgeon  [facility]  [phone]    Disclaimer: This note consists of symbols derived from keyboarding, dictation and/or voice recognition software. As a result, there may be errors in the script that have gone undetected. Please consider this when interpreting information found in this chart.

## 2023-06-19 ENCOUNTER — ANESTHESIA EVENT (OUTPATIENT)
Dept: SURGERY | Facility: CLINIC | Age: 43
End: 2023-06-19
Payer: COMMERCIAL

## 2023-06-19 ENCOUNTER — ANESTHESIA (OUTPATIENT)
Dept: SURGERY | Facility: CLINIC | Age: 43
End: 2023-06-19
Payer: COMMERCIAL

## 2023-06-19 ENCOUNTER — HOSPITAL ENCOUNTER (OUTPATIENT)
Facility: CLINIC | Age: 43
Discharge: HOME | End: 2023-06-19
Attending: PODIATRIST | Admitting: PODIATRIST
Payer: COMMERCIAL

## 2023-06-19 VITALS
WEIGHT: 235 LBS | BODY MASS INDEX: 31.14 KG/M2 | TEMPERATURE: 97.2 F | HEART RATE: 40 BPM | DIASTOLIC BLOOD PRESSURE: 94 MMHG | RESPIRATION RATE: 16 BRPM | HEIGHT: 73 IN | OXYGEN SATURATION: 97 % | SYSTOLIC BLOOD PRESSURE: 121 MMHG

## 2023-06-19 DIAGNOSIS — Z98.890 S/P FOOT SURGERY, LEFT: Primary | ICD-10-CM

## 2023-06-19 LAB
GRAM STAIN RESULT: NORMAL
GRAM STAIN RESULT: NORMAL

## 2023-06-19 PROCEDURE — 360N000075 HC SURGERY LEVEL 2, PER MIN: Performed by: PODIATRIST

## 2023-06-19 PROCEDURE — 250N000011 HC RX IP 250 OP 636: Performed by: PODIATRIST

## 2023-06-19 PROCEDURE — 710N000012 HC RECOVERY PHASE 2, PER MINUTE: Performed by: PODIATRIST

## 2023-06-19 PROCEDURE — 258N000003 HC RX IP 258 OP 636: Performed by: ANESTHESIOLOGY

## 2023-06-19 PROCEDURE — 999N000141 HC STATISTIC PRE-PROCEDURE NURSING ASSESSMENT: Performed by: PODIATRIST

## 2023-06-19 PROCEDURE — 87075 CULTR BACTERIA EXCEPT BLOOD: CPT | Performed by: PODIATRIST

## 2023-06-19 PROCEDURE — 250N000009 HC RX 250: Performed by: PODIATRIST

## 2023-06-19 PROCEDURE — 250N000009 HC RX 250: Performed by: NURSE ANESTHETIST, CERTIFIED REGISTERED

## 2023-06-19 PROCEDURE — 88304 TISSUE EXAM BY PATHOLOGIST: CPT | Mod: TC | Performed by: PODIATRIST

## 2023-06-19 PROCEDURE — 87205 SMEAR GRAM STAIN: CPT | Performed by: PODIATRIST

## 2023-06-19 PROCEDURE — 14040 TIS TRNFR F/C/C/M/N/A/G/H/F: CPT | Mod: 78 | Performed by: PODIATRIST

## 2023-06-19 PROCEDURE — 250N000011 HC RX IP 250 OP 636: Performed by: NURSE ANESTHETIST, CERTIFIED REGISTERED

## 2023-06-19 PROCEDURE — 87077 CULTURE AEROBIC IDENTIFY: CPT | Performed by: PODIATRIST

## 2023-06-19 PROCEDURE — 272N000001 HC OR GENERAL SUPPLY STERILE: Performed by: PODIATRIST

## 2023-06-19 PROCEDURE — 370N000017 HC ANESTHESIA TECHNICAL FEE, PER MIN: Performed by: PODIATRIST

## 2023-06-19 PROCEDURE — 88304 TISSUE EXAM BY PATHOLOGIST: CPT | Mod: 26 | Performed by: PATHOLOGY

## 2023-06-19 PROCEDURE — 11043 DBRDMT MUSC&/FSCA 1ST 20/<: CPT | Mod: 78 | Performed by: PODIATRIST

## 2023-06-19 RX ORDER — FENTANYL CITRATE 50 UG/ML
INJECTION, SOLUTION INTRAMUSCULAR; INTRAVENOUS PRN
Status: DISCONTINUED | OUTPATIENT
Start: 2023-06-19 | End: 2023-06-19

## 2023-06-19 RX ORDER — HYDROMORPHONE HCL IN WATER/PF 6 MG/30 ML
0.2 PATIENT CONTROLLED ANALGESIA SYRINGE INTRAVENOUS EVERY 5 MIN PRN
Status: DISCONTINUED | OUTPATIENT
Start: 2023-06-19 | End: 2023-06-19 | Stop reason: HOSPADM

## 2023-06-19 RX ORDER — DIPHENHYDRAMINE HCL 25 MG
25 CAPSULE ORAL EVERY 6 HOURS PRN
Status: DISCONTINUED | OUTPATIENT
Start: 2023-06-19 | End: 2023-06-19 | Stop reason: HOSPADM

## 2023-06-19 RX ORDER — DIPHENHYDRAMINE HYDROCHLORIDE 50 MG/ML
25 INJECTION INTRAMUSCULAR; INTRAVENOUS EVERY 6 HOURS PRN
Status: DISCONTINUED | OUTPATIENT
Start: 2023-06-19 | End: 2023-06-19 | Stop reason: HOSPADM

## 2023-06-19 RX ORDER — HYDROCODONE BITARTRATE AND ACETAMINOPHEN 5; 325 MG/1; MG/1
TABLET ORAL
Qty: 15 TABLET | Refills: 0 | Status: SHIPPED | OUTPATIENT
Start: 2023-06-19 | End: 2024-06-19

## 2023-06-19 RX ORDER — LIDOCAINE 40 MG/G
CREAM TOPICAL
Status: DISCONTINUED | OUTPATIENT
Start: 2023-06-19 | End: 2023-06-19 | Stop reason: HOSPADM

## 2023-06-19 RX ORDER — CEFAZOLIN SODIUM/WATER 2 G/20 ML
2 SYRINGE (ML) INTRAVENOUS
Status: COMPLETED | OUTPATIENT
Start: 2023-06-19 | End: 2023-06-19

## 2023-06-19 RX ORDER — BUPIVACAINE HYDROCHLORIDE 2.5 MG/ML
INJECTION, SOLUTION EPIDURAL; INFILTRATION; INTRACAUDAL PRN
Status: DISCONTINUED | OUTPATIENT
Start: 2023-06-19 | End: 2023-06-19 | Stop reason: HOSPADM

## 2023-06-19 RX ORDER — DIMENHYDRINATE 50 MG/ML
25 INJECTION, SOLUTION INTRAMUSCULAR; INTRAVENOUS
Status: DISCONTINUED | OUTPATIENT
Start: 2023-06-19 | End: 2023-06-19 | Stop reason: HOSPADM

## 2023-06-19 RX ORDER — HYDRALAZINE HYDROCHLORIDE 20 MG/ML
2.5-5 INJECTION INTRAMUSCULAR; INTRAVENOUS EVERY 10 MIN PRN
Status: DISCONTINUED | OUTPATIENT
Start: 2023-06-19 | End: 2023-06-19 | Stop reason: HOSPADM

## 2023-06-19 RX ORDER — MAGNESIUM HYDROXIDE 1200 MG/15ML
LIQUID ORAL PRN
Status: DISCONTINUED | OUTPATIENT
Start: 2023-06-19 | End: 2023-06-19 | Stop reason: HOSPADM

## 2023-06-19 RX ORDER — ONDANSETRON 2 MG/ML
INJECTION INTRAMUSCULAR; INTRAVENOUS PRN
Status: DISCONTINUED | OUTPATIENT
Start: 2023-06-19 | End: 2023-06-19

## 2023-06-19 RX ORDER — GLYCOPYRROLATE 0.2 MG/ML
INJECTION, SOLUTION INTRAMUSCULAR; INTRAVENOUS PRN
Status: DISCONTINUED | OUTPATIENT
Start: 2023-06-19 | End: 2023-06-19

## 2023-06-19 RX ORDER — HYDROMORPHONE HCL IN WATER/PF 6 MG/30 ML
0.4 PATIENT CONTROLLED ANALGESIA SYRINGE INTRAVENOUS EVERY 5 MIN PRN
Status: DISCONTINUED | OUTPATIENT
Start: 2023-06-19 | End: 2023-06-19 | Stop reason: HOSPADM

## 2023-06-19 RX ORDER — SODIUM CHLORIDE, SODIUM LACTATE, POTASSIUM CHLORIDE, CALCIUM CHLORIDE 600; 310; 30; 20 MG/100ML; MG/100ML; MG/100ML; MG/100ML
INJECTION, SOLUTION INTRAVENOUS CONTINUOUS
Status: DISCONTINUED | OUTPATIENT
Start: 2023-06-19 | End: 2023-06-19 | Stop reason: HOSPADM

## 2023-06-19 RX ORDER — FENTANYL CITRATE 50 UG/ML
25 INJECTION, SOLUTION INTRAMUSCULAR; INTRAVENOUS EVERY 5 MIN PRN
Status: DISCONTINUED | OUTPATIENT
Start: 2023-06-19 | End: 2023-06-19 | Stop reason: HOSPADM

## 2023-06-19 RX ORDER — IBUPROFEN 600 MG/1
TABLET, FILM COATED ORAL
Qty: 30 TABLET | Refills: 0 | Status: SHIPPED | OUTPATIENT
Start: 2023-06-19 | End: 2024-06-19

## 2023-06-19 RX ORDER — DIAZEPAM 10 MG/2ML
2.5 INJECTION, SOLUTION INTRAMUSCULAR; INTRAVENOUS
Status: DISCONTINUED | OUTPATIENT
Start: 2023-06-19 | End: 2023-06-19 | Stop reason: HOSPADM

## 2023-06-19 RX ORDER — KETAMINE HYDROCHLORIDE 10 MG/ML
INJECTION INTRAMUSCULAR; INTRAVENOUS PRN
Status: DISCONTINUED | OUTPATIENT
Start: 2023-06-19 | End: 2023-06-19

## 2023-06-19 RX ORDER — LABETALOL HYDROCHLORIDE 5 MG/ML
10 INJECTION, SOLUTION INTRAVENOUS
Status: DISCONTINUED | OUTPATIENT
Start: 2023-06-19 | End: 2023-06-19 | Stop reason: HOSPADM

## 2023-06-19 RX ORDER — PROPOFOL 10 MG/ML
INJECTION, EMULSION INTRAVENOUS CONTINUOUS PRN
Status: DISCONTINUED | OUTPATIENT
Start: 2023-06-19 | End: 2023-06-19

## 2023-06-19 RX ORDER — ONDANSETRON 4 MG/1
4 TABLET, ORALLY DISINTEGRATING ORAL EVERY 30 MIN PRN
Status: DISCONTINUED | OUTPATIENT
Start: 2023-06-19 | End: 2023-06-19 | Stop reason: HOSPADM

## 2023-06-19 RX ORDER — ACETAMINOPHEN 325 MG/1
975 TABLET ORAL ONCE
Status: DISCONTINUED | OUTPATIENT
Start: 2023-06-19 | End: 2023-06-19 | Stop reason: HOSPADM

## 2023-06-19 RX ORDER — ONDANSETRON 2 MG/ML
4 INJECTION INTRAMUSCULAR; INTRAVENOUS EVERY 30 MIN PRN
Status: DISCONTINUED | OUTPATIENT
Start: 2023-06-19 | End: 2023-06-19 | Stop reason: HOSPADM

## 2023-06-19 RX ORDER — ALBUTEROL SULFATE 0.83 MG/ML
2.5 SOLUTION RESPIRATORY (INHALATION) EVERY 4 HOURS PRN
Status: DISCONTINUED | OUTPATIENT
Start: 2023-06-19 | End: 2023-06-19 | Stop reason: HOSPADM

## 2023-06-19 RX ORDER — HYDROCODONE BITARTRATE AND ACETAMINOPHEN 5; 325 MG/1; MG/1
1 TABLET ORAL
Status: DISCONTINUED | OUTPATIENT
Start: 2023-06-19 | End: 2023-06-19 | Stop reason: HOSPADM

## 2023-06-19 RX ORDER — CEFAZOLIN SODIUM/WATER 2 G/20 ML
2 SYRINGE (ML) INTRAVENOUS SEE ADMIN INSTRUCTIONS
Status: DISCONTINUED | OUTPATIENT
Start: 2023-06-19 | End: 2023-06-19 | Stop reason: HOSPADM

## 2023-06-19 RX ORDER — HYDROXYZINE HYDROCHLORIDE 25 MG/1
TABLET, FILM COATED ORAL
Qty: 15 TABLET | Refills: 0 | Status: SHIPPED | OUTPATIENT
Start: 2023-06-19 | End: 2024-06-19

## 2023-06-19 RX ORDER — FENTANYL CITRATE 50 UG/ML
50 INJECTION, SOLUTION INTRAMUSCULAR; INTRAVENOUS EVERY 5 MIN PRN
Status: DISCONTINUED | OUTPATIENT
Start: 2023-06-19 | End: 2023-06-19 | Stop reason: HOSPADM

## 2023-06-19 RX ORDER — HALOPERIDOL 5 MG/ML
1 INJECTION INTRAMUSCULAR
Status: DISCONTINUED | OUTPATIENT
Start: 2023-06-19 | End: 2023-06-19 | Stop reason: HOSPADM

## 2023-06-19 RX ORDER — KETOROLAC TROMETHAMINE 30 MG/ML
INJECTION, SOLUTION INTRAMUSCULAR; INTRAVENOUS PRN
Status: DISCONTINUED | OUTPATIENT
Start: 2023-06-19 | End: 2023-06-19

## 2023-06-19 RX ADMIN — MIDAZOLAM 2 MG: 1 INJECTION INTRAMUSCULAR; INTRAVENOUS at 09:56

## 2023-06-19 RX ADMIN — Medication 5 MG: at 10:25

## 2023-06-19 RX ADMIN — KETOROLAC TROMETHAMINE 30 MG: 30 INJECTION, SOLUTION INTRAMUSCULAR at 10:46

## 2023-06-19 RX ADMIN — GLYCOPYRROLATE 0.2 MG: 0.2 INJECTION, SOLUTION INTRAMUSCULAR; INTRAVENOUS at 10:03

## 2023-06-19 RX ADMIN — Medication 2 G: at 10:26

## 2023-06-19 RX ADMIN — SODIUM CHLORIDE, POTASSIUM CHLORIDE, SODIUM LACTATE AND CALCIUM CHLORIDE: 600; 310; 30; 20 INJECTION, SOLUTION INTRAVENOUS at 09:00

## 2023-06-19 RX ADMIN — ONDANSETRON 4 MG: 2 INJECTION INTRAMUSCULAR; INTRAVENOUS at 10:39

## 2023-06-19 RX ADMIN — Medication 10 MG: at 10:06

## 2023-06-19 RX ADMIN — FENTANYL CITRATE 50 MCG: 50 INJECTION, SOLUTION INTRAMUSCULAR; INTRAVENOUS at 10:00

## 2023-06-19 RX ADMIN — PROPOFOL 200 MCG/KG/MIN: 10 INJECTION, EMULSION INTRAVENOUS at 09:58

## 2023-06-19 ASSESSMENT — ACTIVITIES OF DAILY LIVING (ADL)
ADLS_ACUITY_SCORE: 36
ADLS_ACUITY_SCORE: 36

## 2023-06-19 NOTE — DISCHARGE INSTRUCTIONS
[facility] ADULT DISCHARGE INSTRUCTIONS   SPECIAL PRECAUTIONS FOR 24 HOURS AFTER SURGERY    IT IS NOT UNUSUAL TO FEEL LIGHT-HEADED OR FAINT, UP TO 24 HOURS AFTER SURGERY OR WHILE TAKING PAIN MEDICATION.  IF YOU HAVE THESE SYMPTOMS; SIT FOR A FEW MINUTES BEFORE STANDING AND HAVE SOMEONE ASSIST YOU WHEN YOU GET UP TO WALK OR USE THE BATHROOM.    YOU SHOULD REST AND RELAX FOR THE NEXT 24 HOURS AND YOU MUST MAKE ARRANGEMENTS TO HAVE SOMEONE STAY WITH YOU FOR AT LEAST 24 HOURS AFTER YOUR DISCHARGE.  AVOID HAZARDOUS AND STRENUOUS ACTIVITIES.  DO NOT MAKE IMPORTANT DECISIONS FOR 24 HOURS.    DO NOT DRIVE ANY VEHICLE OR OPERATE MECHANICAL EQUIPMENT FOR 24 HOURS FOLLOWING THE END OF YOUR SURGERY.  EVEN THOUGH YOU MAY FEEL NORMAL, YOUR REACTIONS MAY BE AFFECTED BY THE MEDICATION YOU HAVE RECEIVED.    DO NOT DRINK ALCOHOLIC BEVERAGES FOR 24 HOURS FOLLOWING YOUR SURGERY.    DRINK CLEAR LIQUIDS (APPLE JUICE, GINGER ALE, 7-UP, BROTH, ETC.).  PROGRESS TO YOUR REGULAR DIET AS YOU FEEL ABLE.    YOU MAY HAVE A DRY MOUTH, A SORE THROAT, MUSCLES ACHES OR TROUBLE SLEEPING.  THESE SHOULD GO AWAY AFTER 24 HOURS.    CALL YOUR DOCTOR FOR ANY OF THE FOLLOWING:  SIGNS OF INFECTION (FEVER, GROWING TENDERNESS AT THE SURGERY SITE, A LARGE AMOUNT OF DRAINAGE OR BLEEDING, SEVERE PAIN, FOUL-SMELLING DRAINAGE, REDNESS OR SWELLING.    IT HAS BEEN OVER 8 TO 10 HOURS SINCE SURGERY AND YOU ARE STILL NOT ABLE TO URINATE (PASS WATER).           You received Toradol, an IV form of Ibuprofen (Motrin) at 10:45am.  Do not take any Ibuprofen products until 4:445pm.  Maximum acetaminophen (Tylenol) dose from all sources should not exceed 4 grams (4000 mg) per day.

## 2023-06-19 NOTE — ANESTHESIA POSTPROCEDURE EVALUATION
Patient: Mason Sin    Procedure: Procedure(s):  Incision and drainage, left foot       Anesthesia Type:  MAC    Note:  Disposition: Outpatient   Postop Pain Control: Uneventful            Sign Out: Well controlled pain   PONV: No   Neuro/Psych: Uneventful            Sign Out: Acceptable/Baseline neuro status   Airway/Respiratory: Uneventful            Sign Out: Acceptable/Baseline resp. status   CV/Hemodynamics: Uneventful            Sign Out: Acceptable CV status; No obvious hypovolemia; No obvious fluid overload   Other NRE: NONE   DID A NON-ROUTINE EVENT OCCUR? No           Last vitals:  Vitals Value Taken Time   /74 06/19/23 1105   Temp 97.8  F (36.6  C) 06/19/23 1055   Pulse 44 06/19/23 1105   Resp     SpO2 97 % 06/19/23 1111   Vitals shown include unvalidated device data.    Electronically Signed By: Tanner Ramachandran MD  June 19, 2023  11:13 AM

## 2023-06-19 NOTE — ANESTHESIA CARE TRANSFER NOTE
Patient: Mason Sin    Procedure: Procedure(s):  Incision and drainage, left foot       Diagnosis: Nonhealing wound of heel [S91.309A]  Diagnosis Additional Information: No value filed.    Anesthesia Type:   MAC     Note:    Oropharynx: oropharynx clear of all foreign objects and spontaneously breathing  Level of Consciousness: drowsy  Oxygen Supplementation: room air    Independent Airway: airway patency satisfactory and stable  Dentition: dentition unchanged  Vital Signs Stable: post-procedure vital signs reviewed and stable  Report to RN Given: handoff report given  Patient transferred to: Phase II    Handoff Report: Identifed the Patient, Identified the Reponsible Provider, Reviewed the pertinent medical history, Discussed the surgical course, Reviewed Intra-OP anesthesia mangement and issues during anesthesia, Set expectations for post-procedure period and Allowed opportunity for questions and acknowledgement of understanding      Vitals:  Vitals Value Taken Time   BP     Temp     Pulse     Resp     SpO2 93 % 06/19/23 1053   Vitals shown include unvalidated device data.    Electronically Signed By: JEFF Chase CRNA  June 19, 2023  10:54 AM

## 2023-06-19 NOTE — ANESTHESIA PREPROCEDURE EVALUATION
Anesthesia Pre-Procedure Evaluation    Patient: Mason Sin   MRN: [id number] : 1980        Procedure : Procedure(s):  Incision and drainage, left foot          Past Medical History:   Diagnosis Date     NO ACTIVE PROBLEMS       Past Surgical History:   Procedure Laterality Date     NO HISTORY OF SURGERY       REPAIR TENDON FOOT Left 2023    Procedure: Primary end-to-end repair of the left extensor hallucis longus tendon;  Surgeon: Prince Ruiz DPM;  Location: [facility] OR      No Known Allergies   Social History     Tobacco Use     Smoking status: Never     Smokeless tobacco: Never   Vaping Use     Vaping status: Never Used   Substance Use Topics     Alcohol use: Not Currently     Comment: occ      Wt Readings from Last 1 Encounters:   23 106.6 kg (235 lb)        Anesthesia Evaluation   Pt has had prior anesthetic. Type: General and Regional.    No history of anesthetic complications       ROS/MED HX  ENT/Pulmonary:  - neg pulmonary ROS     Neurologic:  - neg neurologic ROS     Cardiovascular:  - neg cardiovascular ROS     METS/Exercise Tolerance:     Hematologic:  - neg hematologic  ROS     Musculoskeletal: Comment: Ongoing infection in left foot      GI/Hepatic:  - neg GI/hepatic ROS     Renal/Genitourinary:  - neg Renal ROS     Endo: Comment: Class 1 obesity    (+) Obesity,     Psychiatric/Substance Use:  - neg psychiatric ROS     Infectious Disease:  - neg infectious disease ROS     Malignancy:  - neg malignancy ROS     Other:  - neg other ROS          Physical Exam    Airway        Mallampati: II   TM distance: > 3 FB   Neck ROM: full   Mouth opening: > 3 cm    Respiratory Devices and Support         Dental       (+) Minor Abnormalities - some fillings, tiny chips      Cardiovascular   cardiovascular exam normal       Rhythm and rate: regular and normal     Pulmonary   pulmonary exam normal        breath sounds clear to auscultation       Other findings: Lab Test        23      11/09/21                       1400          0230          WBC          6.2          7.4           HGB          15.7         16.4          MCV          82           85            PLT          242          293            Lab Test        05/22/23 11/09/21                       1400          0230          NA           140          137           POTASSIUM    4.1          4.0           CHLORIDE     105          105           CO2          21*          26            BUN          18.8         14            CR           1.02         1.08          ANIONGAP     14           6             KELSIE          9.3          8.9           GLC          97           95                       OUTSIDE LABS:  CBC:   Lab Results   Component Value Date    WBC 6.2 05/22/2023    WBC 7.4 11/09/2021    HGB 15.7 05/22/2023    HGB 16.4 11/09/2021    HCT 45.8 05/22/2023    HCT 48.2 11/09/2021     05/22/2023     11/09/2021     BMP:   Lab Results   Component Value Date     05/22/2023     11/09/2021    POTASSIUM 4.1 05/22/2023    POTASSIUM 4.0 11/09/2021    CHLORIDE 105 05/22/2023    CHLORIDE 105 11/09/2021    CO2 21 (L) 05/22/2023    CO2 26 11/09/2021    BUN 18.8 05/22/2023    BUN 14 11/09/2021    CR 1.02 05/22/2023    CR 1.08 11/09/2021    GLC 97 05/22/2023    GLC 95 11/09/2021     COAGS: No results found for: PTT, INR, FIBR  POC: No results found for: BGM, HCG, HCGS  HEPATIC:   Lab Results   Component Value Date    ALBUMIN 3.9 11/09/2021    PROTTOTAL 7.5 11/09/2021    ALT 26 11/09/2021    AST 20 11/09/2021    ALKPHOS 47 11/09/2021    BILITOTAL 0.7 11/09/2021     OTHER:   Lab Results   Component Value Date    KELSIE 9.3 05/22/2023    TSH 1.37 11/09/2021       Anesthesia Plan    ASA Status:  2   NPO Status:  NPO Appropriate    Anesthesia Type: MAC.     - Reason for MAC: immobility needed, straight local not clinically adequate   Induction: Intravenous.   Maintenance: TIVA.        Consents    Anesthesia Plan(s) and associated  risks, benefits, and realistic alternatives discussed. Questions answered and patient/representative(s) expressed understanding.     - Discussed: Risks, Benefits and Alternatives for BOTH SEDATION and the PROCEDURE were discussed     - Discussed with:  Patient      - Extended Intubation/Ventilatory Support Discussed: No.      - Patient is DNR/DNI Status: No    Use of blood products discussed: No .     Postoperative Care    Pain management: IV analgesics, Oral pain medications, Multi-modal analgesia.   PONV prophylaxis: Ondansetron (or other 5HT-3), Background Propofol Infusion     Comments:                Tanner Ramachandran MD

## 2023-06-19 NOTE — OP NOTE
Foot & Ankle Surgery  June 19, 2023    Surgeon:   Prince Ruiz DPM FACFAS FACFAOM    Assistant: none    Pre-op diagnosis:  1.  non-healing wound sp Extensor Hallucis Longus repair left foot 5/24/23 with deep infection    Post-op diagnosis:  1. Non-helaing wound sp Extensor Hallucis Longus tendon repair left foot 5/24/23 with deep infection  2. Necrotic Extensor Hallucis Longus tendon    Procedure:  1.  resection of tendon/tissue down to/including deep fascia 6 x 2cm left foot  2. Flap closure of left foot 3 x 2cm and 3 x 2cm     Pathology:  1.  EHL sent for histological evaluatoin  2. Deep tissue, including tendon, sent for aerobic/anaerobic cultures    Anesthesia: MAC    Hemostasis: none    EBL:  10ml    Materials:    1. none    Injectables:  20cc 0.25% bupivacaine plain    Complications:  none    Intra-operative findings:  Necrotic tendon, loss off correction/repair    Indications for procedure:  Deep infection, necrotic tendon    After obtaining verbal consent, the patient was transferred to the operating room and placed in the supine position on the operating table.  IV sedation was initiated.  The foot was anesthetized with the preoperative local.  It was then prepped and draped in normal aseptic fashion.  No tourniquet was utilized.  Patient procedure and sites were correctly identified by the OR staff..    Procedure 1: Resection of tissue down to including deep fascia 6 x 2 cm , including necrotic extensor hallucis longus tendon -attention was directed to the left foot surgical site.  All sutures were removed prior to prep and drape.  The patient had a sizable portion of the incision, the original laceration in fact, that was necrotic.  An incision was drawn out utilizing the previous incision, to excise the nonhealing portion, gain access to the underlying tissue, and to help facilitate closure.  This created a proximal 3 x 2 cm flap and a distal 3 x 2 cm flap.  The incision was carried down  full-thickness to subcutaneous tissue and the nonhealing central wound was handed off.  Deep cultures were obtained.  The base of the wound was debrided down to and including deep fascia, sharply excising all nonviable tissue with a 15 blade and a rongeur.  The extensor hallucis longus tendon was noted to be necrotic and there was loss of correction/repair.  As such, the span of the extensor hallucis longus tendon was sharply excised and handed off to the back table.  A specimen was sent for histological evaluation.    Procedure 2: Flap closure of left foot utilizing two 3 x 2 cm flaps -attention was directed to the base the wound.  After all nonviable tissue was excised, adequate hemostasis was achieved and the wound was flushed with approximately 1.75 L of normal saline.  With great care, the proximal and lateral flaps were mobilized midline and closed single layer with minimal tension utilizing 3-0 nylon      A dry sterile dressing was applied to the patient's left foot.  tHe patient was transferred to the PACU with vital signs stable and vascular status intact.  They appeared to tolerate the procedure and anesthesia well without complication.  He will be discharged to home and will follow up with me in clinic in approximately 1 week or sooner with any acute issues.    Prince Ruiz DPM [facility] FACFA  Podiatric Foot & Ankle Surgeon  [facility]  [phone]

## 2023-06-20 LAB
PATH REPORT.COMMENTS IMP SPEC: NORMAL
PATH REPORT.COMMENTS IMP SPEC: NORMAL
PATH REPORT.FINAL DX SPEC: NORMAL
PATH REPORT.GROSS SPEC: NORMAL
PATH REPORT.MICROSCOPIC SPEC OTHER STN: NORMAL
PATH REPORT.RELEVANT HX SPEC: NORMAL
PHOTO IMAGE: NORMAL

## 2023-06-21 LAB — BACTERIA TISS BX CULT: ABNORMAL

## 2023-06-26 ENCOUNTER — OFFICE VISIT (OUTPATIENT)
Dept: PODIATRY | Facility: CLINIC | Age: 43
End: 2023-06-26
Payer: COMMERCIAL

## 2023-06-26 VITALS — HEIGHT: 73 IN | WEIGHT: 235 LBS | BODY MASS INDEX: 31.14 KG/M2

## 2023-06-26 DIAGNOSIS — Z98.890 S/P FOOT SURGERY, LEFT: ICD-10-CM

## 2023-06-26 DIAGNOSIS — S91.309A NONHEALING WOUND OF HEEL: Primary | ICD-10-CM

## 2023-06-26 LAB — BACTERIA TISS BX CULT: NORMAL

## 2023-06-26 PROCEDURE — 99024 POSTOP FOLLOW-UP VISIT: CPT | Performed by: PODIATRIST

## 2023-06-26 NOTE — PROGRESS NOTES
Foot & Ankle Surgery  June 26, 2023    S:  Patient in today sp 1.  Resection of tendon/tissue down to/including deep fascia 6 x 2 cm left foot; 2.  Flap closure of left foot 3 x 2 cm and 3 x 2 cm on 6/19/2023.  Pain levels minimal.  He has not needed much in the way of Tylenol as of late..  He is touchdown weightbearing with a crutch today using his sandal.    There were no vitals taken for this visit.      ROS - positive for CC.  Patient denies current nausea, vomiting, chills, fevers, belly pain, calf pain, chest pain or SOB.  Complete remainder of ROS is otherwise neg.    PE - sutures intact, skin margins.  There is very subtle superficial gapping at the central area, but this otherwise appears to be healing as expected.  No signs of infection, no drainage.  Skin shows no trophic, color or temperature changes otherwise.  No calf redness, swelling or pain noted otherwise.      Pathology -   Final Diagnosis   Soft tissue designated tendon, left foot, excision:  -Tendon tissue with coagulative necrosis and scant acute inflammation; cultures in process (please see reports when completed)       Cultures -   Culture 1+ Staphylococcus aureus Abnormal              A/P - 42 year old yo patient approx 1 week sp above procedure  -We reviewed the procedure and intraoperative findings  -We reviewed the pathology and culture results  -A new Betadine bandage was applied.  Continue touchdown weightbearing with a crutch.  Continue all postoperative instructions  -We had a good discussion today about future options including monitoring, and revision surgery for MSK functional rehab.  The patient is in favor of monitoring for now which I think is reasonable.  We briefly discussed flexor tenotomy versus first MPJ fusion should the toe progressively aligned in a plantarflexed position.  -We discussed utilizing pain and functional deficit as our guide as to whether further intervention would be necessary.  Timing and type of further  surgery would be dependent on symptoms as well as an updated clinical exam.  -He would like to do left lower extremity exercises in hopes of minimizing atrophy.  I recommend keeping this to exercises at and above the knee in hopes of minimizing strain on the incision.  Anticipate PT referral once the incision has healed    Follow up  -1 week or sooner with acute issues      Prince Ruiz DPM [facility] FACFA  Podiatric Foot & Ankle Surgeon  [facility]  [phone]    Disclaimer: This note consists of symbols derived from keyboarding, dictation and/or voice recognition software. As a result, there may be errors in the script that have gone undetected. Please consider this when interpreting information found in this chart.

## 2023-07-06 ENCOUNTER — OFFICE VISIT (OUTPATIENT)
Dept: PODIATRY | Facility: CLINIC | Age: 43
End: 2023-07-06
Payer: COMMERCIAL

## 2023-07-06 VITALS — DIASTOLIC BLOOD PRESSURE: 70 MMHG | SYSTOLIC BLOOD PRESSURE: 118 MMHG

## 2023-07-06 DIAGNOSIS — Z98.890 S/P FOOT SURGERY, LEFT: Primary | ICD-10-CM

## 2023-07-06 PROCEDURE — 99024 POSTOP FOLLOW-UP VISIT: CPT | Performed by: PODIATRIST

## 2023-07-06 NOTE — PROGRESS NOTES
Foot & Ankle Surgery  July 6, 2023    S:  Patient in today sp   1.  Resection of tendon/tissue down to and including deep fascia 6 x 2 cm left foot;  2.  Flap closure of the left foot 3 x 2 cm and 3 x 2 cm on 6/19/2023.  Pain levels minimal.  He states he has been able to be up and about more before swelling and discomfort develop.    There were no vitals taken for this visit.      ROS - positive for CC.  Patient denies current nausea, vomiting, chills, fevers, belly pain, calf pain, chest pain or SOB.  Complete remainder of ROS is otherwise neg.    PE -all sutures were removed, the incision is healing very well.  No drainage or signs of infection are seen today..  Skin shows no trophic, color or temperature changes otherwise.  No calf redness, swelling or pain noted otherwise.      A/P - 42 year old yo patient approx 2 weeks sp above procedure  -All sutures were removed, no Steri-Strips were needed.  No bandaging is necessary at this point unless he sees small open areas that are draining.  -No indication for further antibiotics  -[facility] PT referral to start left lower extremity musculoskeletal functional rehab in hopes of minimizing excessive atrophy.  We discussed that the EHL tendon is likely going to atrophy  -Gradual return to shoes and activities, rest/ice/elevate and utilize dispensed Tensogrip compression sleeve as needed for swelling and pain control  -Okay to wash the foot tomorrow but avoid soaking/submerging x1 week  -We again discussed flexor tenotomy versus first MPJ fusion as options moving forward should pain, flexion deformity or musculoskeletal deficits develop.    Follow up  -4 weeks or sooner with acute issues    Plan for [id number] -once healed sufficiently to resume job duties      Prince Ruiz DPM FACFAS FACFAOM  Podiatric Foot & Ankle Surgeon  [facility]  [phone]    Disclaimer: This note consists of symbols derived from keyboarding, dictation and/or voice  recognition software. As a result, there may be errors in the script that have gone undetected. Please consider this when interpreting information found in this chart.

## 2023-07-13 ENCOUNTER — THERAPY VISIT (OUTPATIENT)
Dept: PHYSICAL THERAPY | Facility: CLINIC | Age: 43
End: 2023-07-13
Attending: PODIATRIST
Payer: COMMERCIAL

## 2023-07-13 DIAGNOSIS — Z98.890 S/P FOOT SURGERY, LEFT: ICD-10-CM

## 2023-07-13 PROCEDURE — 97161 PT EVAL LOW COMPLEX 20 MIN: CPT | Mod: GP | Performed by: PHYSICAL MEDICINE & REHABILITATION

## 2023-07-13 PROCEDURE — 97110 THERAPEUTIC EXERCISES: CPT | Mod: GP | Performed by: PHYSICAL MEDICINE & REHABILITATION

## 2023-07-13 NOTE — PROGRESS NOTES
PHYSICAL THERAPY EVALUATION  Type of Visit: Evaluation    See electronic medical record for Abuse and Falls Screening details.    Subjective   Pt arrived to PT today for L foot pain s/p EHL repair performed 5/24/23. Pt then ended up with necrosis and a second surgery 6/19/23. Pt arrives today with minimal to no pain but looking to improve function in order to return to PLOF. Notes surgeon said function of the EHL might be nonexistent due to necrosis. Pt reports pain, trouble putting weight on it, swelling, stiffness, loss of strength, and trouble with walking.  Presenting condition or subjective complaint: Physical therapy, following foot surgery. Loss of Extensor hallucis longus  Date of onset: 05/24/23   Relevant medical history per pt report: unremarkable   Dates & types of surgery: 5/24/2023 attempted repair to lacerated Extensor hallucis longus AND 6/19/2023 Cleaned infected surgery site and confirmed loss of Extensor hallucis longus    Prior therapy history for the same diagnosis, illness or injury: No      Prior Level of Function   Transfers: Independent  Ambulation: Independent  ADL: Independent    Living Environment  Social support: With family members   Type of home: House   Stairs to enter the home: No  Ramp: No   Stairs inside the home: Yes 11 Is there a railing: Yes   Help at home: Self Cares (home health aide/personal care attendant, family, etc)  Equipment owned: Straight Cane; Crutches     Employment: Not Applicable     Patient goals for therapy: All my regular activities. Heavy lifting, biking/jogging, walk long distance    Pain assessment: Pt reports pain is dull and aching, and intermittent in nature. Pain at best is 0/10 and at worst 3-5/10. Notes pain worse with walking and carrying. Better with rest.      Objective   FOOT/ANKLE EVALUATION  INTEGUMENTARY (edema, incisions): incision healing and closed, no signs of discharge. Hypomobility and hypersensitivity near distal 1/3 of incision (medial  aspect > lateral)  POSTURE: Standing Posture: Rounded shoulders, Forward head, Thoracic kyphosis increased  GAIT:   Weightbearing Status: WBAT  Assistive Device(s): None  Gait Deviations: decreased heel strike and toe off  BALANCE/PROPRIOCEPTION:  SLS on firm surface R: 30 seconds, L: 21 seconds  ROM: DF: 8*, PF: 54*, Inversion: 32*, Eversion: 18*, no pain  STRENGTH: 5-/5 global ankle strength without pain  FLEXIBILITY: tightness of gastroc/soleus, EHL  SPECIAL TESTS: normal ligament testing  PALPATION: no increase in sx with mod palpation  JOINT MOBILITY: hypomobility of first MET and talocrual jt    Assessment & Plan   CLINICAL IMPRESSIONS   Medical Diagnosis: S/P foot surgery, left    Treatment Diagnosis: L foot pain s/p EHL repair  Impression/Assessment: Patient is a 42 year old male with L foot pain s/p EHL repair complaints.  The following significant findings have been identified: Pain, Decreased ROM/flexibility, Decreased joint mobility, Decreased strength, Impaired balance, Decreased proprioception, Impaired sensation, Inflammation, Edema, Impaired gait, Impaired muscle performance, Decreased activity tolerance, Impaired posture, and Instability. These impairments interfere with their ability to perform self care tasks, work tasks, recreational activities, household chores, driving , household mobility, and community mobility as compared to previous level of function.     Clinical Decision Making (Complexity):   Clinical Presentation: Stable/Uncomplicated  Clinical Presentation Rationale: based on medical and personal factors listed in PT evaluation  Clinical Decision Making (Complexity): Low complexity    PLAN OF CARE  Treatment Interventions:  Modalities: Cryotherapy, E-stim, Hot Pack, Ultrasound, TENS  Interventions: Gait Training, Manual Therapy, Neuromuscular Re-education, Therapeutic Activity, Therapeutic Exercise, Self-Care/Home Management    Long Term Goals  PT Goal 1  Goal Identifier: 1  Goal  Description: Pt will demonstrate full L ankle ROM in order to decrease difficulty with amb  Target Date: 07/27/23  PT Goal 2  Goal Identifier: 2  Goal Description: Pt will improve LEFS by 10 points, indicating improve function of ADLs  Target Date: 08/10/23  PT Goal 3  Goal Identifier: 3  Goal Description: Pt will be independent with HEP in order to self manage symptoms  Target Date: 08/24/23  PT Goal 4  Goal Identifier: 4  Goal Description: Pt will be able to jog without increase in sx in order to decrease diffiuclty with recreational activities  Target Date: 08/24/23  PT Goal 5  Goal Identifier: 5  Goal Description: Pt will be able to amb carrying 40# without increase in sx in order to decrease difficulty with lifting and carrying kids  Target Date: 09/08/23  PT Goal 6  Goal Identifier: 6  Goal Description: Pt will be able to perform 30 seconds SLS EC in order to decrease difficulty with fall risk  Target Date: 09/08/23    Frequency of Treatment: 1x/week  Duration of Treatment: 8 weeks    Recommended Referrals to Other Professionals:  none  Education Assessment:   Learner/Method: Patient  Education Comments: pt demonstrated and verbalized good understanding of exercises    Risks and benefits of evaluation/treatment have been explained.   Patient/Family/caregiver agrees with Plan of Care.     Evaluation Time:   PT Eval, Low Complexity Minutes (85299): 10   Present: Not applicable    Signing Clinician: Kely Sr PT      Please contact me with any questions or concerns.    Thank you for your referral,     Kely Sr, PT, DPT  Physical Therapist  [place] [id number] [phone]

## 2023-07-20 ENCOUNTER — THERAPY VISIT (OUTPATIENT)
Dept: PHYSICAL THERAPY | Facility: CLINIC | Age: 43
End: 2023-07-20
Attending: PODIATRIST
Payer: COMMERCIAL

## 2023-07-20 DIAGNOSIS — Z98.890 S/P FOOT SURGERY, LEFT: Primary | ICD-10-CM

## 2023-07-20 PROCEDURE — 97110 THERAPEUTIC EXERCISES: CPT | Mod: GP | Performed by: PHYSICAL MEDICINE & REHABILITATION

## 2023-07-20 PROCEDURE — 97140 MANUAL THERAPY 1/> REGIONS: CPT | Mod: GP | Performed by: PHYSICAL MEDICINE & REHABILITATION

## 2023-07-27 ENCOUNTER — THERAPY VISIT (OUTPATIENT)
Dept: PHYSICAL THERAPY | Facility: CLINIC | Age: 43
End: 2023-07-27
Attending: PODIATRIST
Payer: COMMERCIAL

## 2023-07-27 DIAGNOSIS — Z98.890 S/P FOOT SURGERY, LEFT: Primary | ICD-10-CM

## 2023-07-27 PROCEDURE — 97110 THERAPEUTIC EXERCISES: CPT | Mod: GP | Performed by: PHYSICAL MEDICINE & REHABILITATION

## 2023-08-01 ENCOUNTER — OFFICE VISIT (OUTPATIENT)
Dept: PODIATRY | Facility: CLINIC | Age: 43
End: 2023-08-01
Payer: COMMERCIAL

## 2023-08-01 VITALS
WEIGHT: 235 LBS | DIASTOLIC BLOOD PRESSURE: 75 MMHG | SYSTOLIC BLOOD PRESSURE: 110 MMHG | HEIGHT: 73 IN | BODY MASS INDEX: 31.14 KG/M2

## 2023-08-01 DIAGNOSIS — Z98.890 S/P FOOT SURGERY, LEFT: Primary | ICD-10-CM

## 2023-08-01 PROCEDURE — 99024 POSTOP FOLLOW-UP VISIT: CPT | Performed by: PODIATRIST

## 2023-08-01 NOTE — PROGRESS NOTES
"Foot & Ankle Surgery  August 1, 2023    S:  Patient in today sp   1.  Resection of tendon/tissue down to/including deep fascia 6 x 2cm left foot 2.  Flap closure of left foot 3 x 2cm and 3 x 2cm  on 6/19/23 for infected non-healing EHL repair site, original DOS 5/24/23.  Pain levels minimal.  She has completed 3 PT sessions and is doing his home exercise program.  He states the foot is otherwise doing well.  He does have some discomfort in the foot and the left lower extremity with doing his functional rehab program but otherwise states the foot is not specifically bothersome currently    /75   Ht 1.854 m (6' 1\")   Wt 106.6 kg (235 lb)   BMI 31.00 kg/m        ROS - positive for CC.  Patient denies current nausea, vomiting, chills, fevers, belly pain, calf pain, chest pain or SOB.  Complete remainder of ROS is otherwise neg.    PE -there is extensor hallucis brevis firing.  There is noticeable droop of the first toe versus the second but this is stable without worsening or progression.  Passive range of motion of the first MPJ presents with mild discomfort but minimal crepitus.  Skin shows no trophic, color or temperature changes otherwise.  No calf redness, swelling or pain noted otherwise.    A/P - 42 year old yo patient approx 6 weeks sp above procedure  -Clinically, the patient is doing well.  He is back to shoes and getting back to regular activities.  He has completed multiple PT sessions and is doing his home exercise program  -He is currently happy with the progression he is making.  As such, no further follow-up is necessary.  If at some point pain and/or functional deficits develop or persist, and his condition is no longer satisfactory, he will follow-up for reevaluation.  We previously discussed FHL tenotomy versus first MPJ fusion should the musculoskeletal imbalance around the joint become more problematic  -All questions were answered, the patient is happy with the plan    Follow up  -  prn or " sooner with acute issues    Plan for [id number] - when able to resume job duties       Prince Ruiz DPM FACFAS FACFAOM  Podiatric Foot & Ankle Surgeon  [facility]  [phone]    Disclaimer: This note consists of symbols derived from keyboarding, dictation and/or voice recognition software. As a result, there may be errors in the script that have gone undetected. Please consider this when interpreting information found in this chart.

## 2023-10-17 ENCOUNTER — IMMUNIZATION (OUTPATIENT)
Dept: FAMILY MEDICINE | Facility: CLINIC | Age: 43
End: 2023-10-17
Payer: COMMERCIAL

## 2023-10-17 DIAGNOSIS — Z23 HIGH PRIORITY FOR 2019-NCOV VACCINE: Primary | ICD-10-CM

## 2023-10-17 PROCEDURE — 90480 ADMN SARSCOV2 VAC 1/ONLY CMP: CPT

## 2023-10-17 PROCEDURE — 99207 PR NO CHARGE NURSE ONLY: CPT

## 2023-10-17 PROCEDURE — 91320 SARSCV2 VAC 30MCG TRS-SUC IM: CPT

## 2023-11-21 NOTE — PROGRESS NOTES
Outpatient Physical Therapy Discharge Note     Patient: Mason Sin  : 1980    Beginning/End Dates of Reporting Period:  23 to 23    Referring Provider: Prince Mendes Diagnosis:  L foot pain s/p EHL repair     Patient did not return for follow up treatments.  Goal status and current objective information is therefore unknown.  Discharge from PT services at this time for this episode of treatment. Please see attached documentation under this episode of care for further information including dates of service, start of care date, referring physician, Dx, treatment plan, treatments, etc.    Please contact me with any questions or concerns.    Thank you for your referral.    Kely Sr, PT, DPT  Physical Therapist  [address] [address] [phone]

## 2024-06-16 ENCOUNTER — HEALTH MAINTENANCE LETTER (OUTPATIENT)
Age: 44
End: 2024-06-16

## 2024-06-19 ENCOUNTER — OFFICE VISIT (OUTPATIENT)
Dept: FAMILY MEDICINE | Facility: CLINIC | Age: 44
End: 2024-06-19
Payer: COMMERCIAL

## 2024-06-19 VITALS
RESPIRATION RATE: 16 BRPM | OXYGEN SATURATION: 99 % | SYSTOLIC BLOOD PRESSURE: 112 MMHG | WEIGHT: 223 LBS | DIASTOLIC BLOOD PRESSURE: 78 MMHG | BODY MASS INDEX: 29.55 KG/M2 | HEIGHT: 73 IN | HEART RATE: 59 BPM

## 2024-06-19 DIAGNOSIS — F90.9 ATTENTION DEFICIT HYPERACTIVITY DISORDER (ADHD), UNSPECIFIED ADHD TYPE: ICD-10-CM

## 2024-06-19 DIAGNOSIS — Z00.00 ROUTINE GENERAL MEDICAL EXAMINATION AT A HEALTH CARE FACILITY: Primary | ICD-10-CM

## 2024-06-19 PROCEDURE — 99396 PREV VISIT EST AGE 40-64: CPT | Performed by: FAMILY MEDICINE

## 2024-06-19 RX ORDER — LISDEXAMFETAMINE DIMESYLATE 10 MG/1
CAPSULE ORAL
COMMUNITY
Start: 2024-06-03

## 2024-06-19 RX ORDER — LISDEXAMFETAMINE DIMESYLATE 20 MG/1
CAPSULE ORAL
COMMUNITY
Start: 2024-06-03

## 2024-06-19 SDOH — HEALTH STABILITY: PHYSICAL HEALTH: ON AVERAGE, HOW MANY MINUTES DO YOU ENGAGE IN EXERCISE AT THIS LEVEL?: 20 MIN

## 2024-06-19 SDOH — HEALTH STABILITY: PHYSICAL HEALTH: ON AVERAGE, HOW MANY DAYS PER WEEK DO YOU ENGAGE IN MODERATE TO STRENUOUS EXERCISE (LIKE A BRISK WALK)?: 1 DAY

## 2024-06-19 ASSESSMENT — SOCIAL DETERMINANTS OF HEALTH (SDOH): HOW OFTEN DO YOU GET TOGETHER WITH FRIENDS OR RELATIVES?: ONCE A WEEK

## 2024-06-19 NOTE — PATIENT INSTRUCTIONS
"Patient Education   Yoga with desmond - youtube   Preventive Care Advice   This is general advice we often give to help people stay healthy. Your care team may have specific advice just for you. Please talk to your care team about your own preventive care needs.  Lifestyle  Exercise at least 150 minutes each week (30 minutes a day, 5 days a week).  Do muscle strengthening activities 2 days a week. These help control your weight and prevent disease.  No smoking.  Wear sunscreen to prevent skin cancer.  Have your home tested for radon every 2 to 5 years. Radon is a colorless, odorless gas that can harm your lungs. To learn more, go to www.health.[facility].[place].us and search for \"Radon in Homes.\"  Keep guns unloaded and locked up in a safe place like a safe or gun vault, or, use a gun lock and hide the keys. Always lock away bullets separately. To learn more, visit [place].gov and search for \"safe gun storage.\"  Nutrition  Eat 5 or more servings of fruits and vegetables each day.  Try wheat bread, brown rice and whole grain pasta (instead of white bread, rice, and pasta).  Get enough calcium and vitamin D. Check the label on foods and aim for 100% of the RDA (recommended daily allowance).  Regular exams  Have a dental exam and cleaning every 6 months.  See your health care team every year to talk about:  Any changes in your health.  Any medicines your care team has prescribed.  Preventive care, family planning, and ways to prevent chronic diseases.  Shots (vaccines)   HPV shots (up to age 26), if you've never had them before.  Hepatitis B shots (up to age 59), if you've never had them before.  COVID-19 shot: Get this shot when it's due.  Flu shot: Get a flu shot every year.  Tetanus shot: Get a tetanus shot every 10 years.  Pneumococcal, hepatitis A, and RSV shots: Ask your care team if you need these based on your risk.  Shingles shot (for age 50 and up).  General health tests  Diabetes screening:  Starting at age 35, Get " screened for diabetes at least every 3 years.  If you are younger than age 35, ask your care team if you should be screened for diabetes.  Cholesterol test: At age 39, start having a cholesterol test every 5 years, or more often if advised.  Bone density scan (DEXA): At age 50, ask your care team if you should have this scan for osteoporosis (brittle bones).  Hepatitis C: Get tested at least once in your life.  Abdominal aortic aneurysm screening: Talk to your doctor about having this screening if you:  Have ever smoked; and  Are biologically male; and  Are between the ages of 65 and 75.  STIs (sexually transmitted infections)  Before age 24: Ask your care team if you should be screened for STIs.  After age 24: Get screened for STIs if you're at risk. You are at risk for STIs (including HIV) if:  You are sexually active with more than one person.  You don't use condoms every time.  You or a partner was diagnosed with a sexually transmitted infection.  If you are at risk for HIV, ask about PrEP medicine to prevent HIV.  Get tested for HIV at least once in your life, whether you are at risk for HIV or not.  Cancer screening tests  Cervical cancer screening: If you have a cervix, begin getting regular cervical cancer screening tests at age 21. Most people who have regular screenings with normal results can stop after age 65. Talk about this with your provider.  Breast cancer scan (mammogram): If you've ever had breasts, begin having regular mammograms starting at age 40. This is a scan to check for breast cancer.  Colon cancer screening: It is important to start screening for colon cancer at age 45.  Have a colonoscopy test every 10 years (or more often if you're at risk) Or, ask your provider about stool tests like a FIT test every year or Cologuard test every 3 years.  To learn more about your testing options, visit: www.[employer]/007905.pdf.  For help making a decision, visit: lauren/fc64480.  Prostate cancer  screening test: If you have a prostate and are age 55 to 69, ask your provider if you would benefit from a yearly prostate cancer screening test.  Lung cancer screening: If you are a current or former smoker age 50 to 80, ask your care team if ongoing lung cancer screenings are right for you.  For informational purposes only. Not to replace the advice of your health care provider. Copyright   2023 [facility]. All rights reserved. Clinically reviewed by the [facility]. [employer] 739818 - REV 04/24.

## 2024-06-19 NOTE — PROGRESS NOTES
"Preventive Care Visit  [facility]  CHENTE ROBB DO, Family Medicine  Jun 19, 2024      Assessment & Plan     Routine general medical examination at a health care facility  - Basic metabolic panel  (Ca, Cl, CO2, Creat, Gluc, K, Na, BUN)  - Lipid panel reflex to direct LDL Fasting    Attention deficit hyperactivity disorder (ADHD), unspecified ADHD type  Working with psychiatrist stable with vyavanse     BMI  Estimated body mass index is 29.42 kg/m  as calculated from the following:    Height as of this encounter: 1.854 m (6' 1\").    Weight as of this encounter: 101.2 kg (223 lb).       Counseling  Appropriate preventive services were discussed with this patient, including applicable screening as appropriate for fall prevention, nutrition, physical activity, Tobacco-use cessation, weight loss and cognition.  Checklist reviewing preventive services available has been given to the patient.  Reviewed patient's diet, addressing concerns and/or questions.   He is at risk for lack of exercise and has been provided with information to increase physical activity for the benefit of his well-being.   He is at risk for psychosocial distress and has been provided with information to reduce risk.       Abhijit Cuevas is a 43 year old, presenting for the following:  Physical        6/19/2024    11:00 AM   Additional Questions   Roomed by Roxanne MCKEON MA   Accompanied by Self        Health Care Directive  Patient does not have a Health Care Directive or Living Will: Discussed advance care planning with patient; information given to patient to review.    HPI    Works with psychiatrist-was diagnosed with ADHD as an adult-started on vyvanse     Brother recently diagnosed with multiple myeloma         6/19/2024   General Health   How would you rate your overall physical health? Good   Feel stress (tense, anxious, or unable to sleep) Only a little      (!) STRESS CONCERN      6/19/2024   Nutrition   Three " or more servings of calcium each day? Yes   Diet: Regular (no restrictions)   How many servings of fruit and vegetables per day? (!) 2-3   How many sweetened beverages each day? 0-1          6/19/2024   Exercise   Days per week of moderate/strenous exercise 1 day   Average minutes spent exercising at this level 20 min     (!) EXERCISE CONCERN      6/19/2024   Social Factors   Frequency of gathering with friends or relatives Once a week   Worry food won't last until get money to buy more No   Food not last or not have enough money for food? No   Do you have housing? (Housing is defined as stable permanent housing and does not include staying ouside in a car, in a tent, in an abandoned building, in an overnight shelter, or couch-surfing.) Yes   Are you worried about losing your housing? No   Lack of transportation? No   Unable to get utilities (heat,electricity)? No          6/19/2024   Dental   Dentist two times every year? Yes          6/19/2024   TB Screening   Were you born outside of the [place]? No      Today's PHQ-2 Score:       6/19/2024     7:52 AM   PHQ-2 ( 1999 [employer])   Q1: Little interest or pleasure in doing things 0   Q2: Feeling down, depressed or hopeless 0   PHQ-2 Score 0   Q1: Little interest or pleasure in doing things Not at all   Q2: Feeling down, depressed or hopeless Not at all   PHQ-2 Score 0           6/19/2024   Substance Use   Alcohol more than 3/day or more than 7/wk No   Do you use any other substances recreationally? No        Social History     Tobacco Use    Smoking status: Never     Passive exposure: Never    Smokeless tobacco: Never   Vaping Use    Vaping status: Never Used   Substance Use Topics    Alcohol use: Not Currently     Comment: occ    Drug use: No         6/19/2024   One time HIV Screening   Previous HIV test? No          6/19/2024   STI Screening   New sexual partner(s) since last STI/HIV test? No      ASCVD Risk   The 10-year ASCVD risk score (Karl PIÑA, et al.,  "2019) is: 1.4%    Values used to calculate the score:      Age: 43 years      Sex: Male      Is Non- : No      Diabetic: No      Tobacco smoker: No      Systolic Blood Pressure: 112 mmHg      Is BP treated: No      HDL Cholesterol: 43 mg/dL      Total Cholesterol: 198 mg/dL        6/19/2024   Contraception/Family Planning   Questions about contraception or family planning No      Reviewed and updated as needed this visit by Provider   Tobacco     Med Hx  Surg Hx  Fam Hx          Past Medical History:   Diagnosis Date    ADHD (attention deficit hyperactivity disorder)     NO ACTIVE PROBLEMS      Past Surgical History:   Procedure Laterality Date    INCISION AND DRAINAGE FOOT, COMBINED Left 6/19/2023    Procedure: 1.  Resection of tendon/tissue down to/including deep fascia 6 x 2cm left foot 2.  Flap closure of left foot 3 x 2cm and 3 x 2cm;  Surgeon: Prince Ruiz DPM;  Location: [facility] OR    NO HISTORY OF SURGERY      REPAIR TENDON FOOT Left 5/24/2023    Procedure: Primary end-to-end repair of the left extensor hallucis longus tendon;  Surgeon: Prince Ruiz DPM;  Location: [facility] OR     Review of Systems  Constitutional, HEENT, cardiovascular, pulmonary, gi and gu systems are negative, except as otherwise noted.     Objective    Exam  /78 (BP Location: Right arm, Patient Position: Chair, Cuff Size: Adult Regular)   Pulse 59   Resp 16   Ht 1.854 m (6' 1\")   Wt 101.2 kg (223 lb)   SpO2 99%   BMI 29.42 kg/m     Estimated body mass index is 29.42 kg/m  as calculated from the following:    Height as of this encounter: 1.854 m (6' 1\").    Weight as of this encounter: 101.2 kg (223 lb).    Physical Exam  Constitutional:       Appearance: Normal appearance.   HENT:      Head: Normocephalic.   Eyes:      Conjunctiva/sclera: Conjunctivae normal.   Cardiovascular:      Rate and Rhythm: Normal rate and regular rhythm.      Pulses: Normal pulses.   Pulmonary:      Effort: Pulmonary " effort is normal.      Breath sounds: Normal breath sounds.   Abdominal:      General: Bowel sounds are normal.   Skin:     General: Skin is warm and dry.   Neurological:      General: No focal deficit present.      Mental Status: He is alert.   Psychiatric:         Mood and Affect: Mood normal.       Signed Electronically by: CHENTE ROBB DO

## 2024-06-24 ENCOUNTER — LAB (OUTPATIENT)
Dept: LAB | Facility: CLINIC | Age: 44
End: 2024-06-24
Payer: COMMERCIAL

## 2024-06-24 DIAGNOSIS — Z00.00 ROUTINE GENERAL MEDICAL EXAMINATION AT A HEALTH CARE FACILITY: ICD-10-CM

## 2024-06-24 PROCEDURE — 80061 LIPID PANEL: CPT

## 2024-06-24 PROCEDURE — 36415 COLL VENOUS BLD VENIPUNCTURE: CPT

## 2024-06-24 PROCEDURE — 80048 BASIC METABOLIC PNL TOTAL CA: CPT

## 2024-06-25 LAB
ANION GAP SERPL CALCULATED.3IONS-SCNC: 9 MMOL/L (ref 7–15)
BUN SERPL-MCNC: 22.2 MG/DL (ref 6–20)
CALCIUM SERPL-MCNC: 9.3 MG/DL (ref 8.6–10)
CHLORIDE SERPL-SCNC: 101 MMOL/L (ref 98–107)
CHOLEST SERPL-MCNC: 214 MG/DL
CREAT SERPL-MCNC: 1.14 MG/DL (ref 0.67–1.17)
DEPRECATED HCO3 PLAS-SCNC: 27 MMOL/L (ref 22–29)
EGFRCR SERPLBLD CKD-EPI 2021: 82 ML/MIN/1.73M2
FASTING STATUS PATIENT QL REPORTED: YES
FASTING STATUS PATIENT QL REPORTED: YES
GLUCOSE SERPL-MCNC: 84 MG/DL (ref 70–99)
HDLC SERPL-MCNC: 52 MG/DL
LDLC SERPL CALC-MCNC: 154 MG/DL
NONHDLC SERPL-MCNC: 162 MG/DL
POTASSIUM SERPL-SCNC: 4.7 MMOL/L (ref 3.4–5.3)
SODIUM SERPL-SCNC: 137 MMOL/L (ref 135–145)
TRIGL SERPL-MCNC: 41 MG/DL

## 2024-08-12 ENCOUNTER — APPOINTMENT (OUTPATIENT)
Dept: GENERAL RADIOLOGY | Facility: CLINIC | Age: 44
End: 2024-08-12
Attending: NURSE PRACTITIONER
Payer: COMMERCIAL

## 2024-08-12 ENCOUNTER — HOSPITAL ENCOUNTER (EMERGENCY)
Facility: CLINIC | Age: 44
Discharge: HOME | End: 2024-08-12
Attending: NURSE PRACTITIONER | Admitting: NURSE PRACTITIONER
Payer: COMMERCIAL

## 2024-08-12 VITALS
DIASTOLIC BLOOD PRESSURE: 78 MMHG | HEART RATE: 76 BPM | TEMPERATURE: 97.8 F | RESPIRATION RATE: 16 BRPM | SYSTOLIC BLOOD PRESSURE: 109 MMHG | OXYGEN SATURATION: 95 %

## 2024-08-12 DIAGNOSIS — S29.011A INTERCOSTAL MUSCLE STRAIN, INITIAL ENCOUNTER: ICD-10-CM

## 2024-08-12 PROCEDURE — 71101 X-RAY EXAM UNILAT RIBS/CHEST: CPT | Mod: LT

## 2024-08-12 PROCEDURE — G0463 HOSPITAL OUTPT CLINIC VISIT: HCPCS | Performed by: NURSE PRACTITIONER

## 2024-08-12 PROCEDURE — 99213 OFFICE O/P EST LOW 20 MIN: CPT | Performed by: NURSE PRACTITIONER

## 2024-08-12 ASSESSMENT — COLUMBIA-SUICIDE SEVERITY RATING SCALE - C-SSRS
6. HAVE YOU EVER DONE ANYTHING, STARTED TO DO ANYTHING, OR PREPARED TO DO ANYTHING TO END YOUR LIFE?: NO
1. IN THE PAST MONTH, HAVE YOU WISHED YOU WERE DEAD OR WISHED YOU COULD GO TO SLEEP AND NOT WAKE UP?: NO
2. HAVE YOU ACTUALLY HAD ANY THOUGHTS OF KILLING YOURSELF IN THE PAST MONTH?: NO

## 2024-08-12 ASSESSMENT — ACTIVITIES OF DAILY LIVING (ADL)
ADLS_ACUITY_SCORE: 36
ADLS_ACUITY_SCORE: 34

## 2024-08-12 NOTE — ED PROVIDER NOTES
ED Provider Note  [facility]      History     Chief Complaint   Patient presents with    Rib Pain     HPI  Mason Sin is a 43 year old male who presents today with painful left ribs, reports that he fell while waterskiing today and since this time has had pain over left ribs which is reproducible with palpation over left ribs.  Denies any difficulty breathing, no hemoptysis.  No recent illness with coughing or fevers.            Allergies:  No Known Allergies    Problem List:    Patient Active Problem List    Diagnosis Date Noted    S/P foot surgery, left 07/20/2023     Priority: Medium    Strain of lumbar paraspinous muscle, initial encounter 04/10/2023     Priority: Medium    Recurrent herpes simplex 11/05/2012     Priority: Medium     Recurrent herpes patch on right side of face since childhood- triggered by weather change- or change of seasons- 21 tablets usually lasts him a year        CARDIOVASCULAR SCREENING; LDL GOAL LESS THAN 160 10/31/2010     Priority: Medium    TMJ (temporomandibular joint syndrome) 07/01/2009     Priority: Medium        Past Medical History:    Past Medical History:   Diagnosis Date    ADHD (attention deficit hyperactivity disorder)     NO ACTIVE PROBLEMS        Past Surgical History:    Past Surgical History:   Procedure Laterality Date    INCISION AND DRAINAGE FOOT, COMBINED Left 6/19/2023    Procedure: 1.  Resection of tendon/tissue down to/including deep fascia 6 x 2cm left foot 2.  Flap closure of left foot 3 x 2cm and 3 x 2cm;  Surgeon: Prince Ruiz DPM;  Location: [facility] OR    NO HISTORY OF SURGERY      REPAIR TENDON FOOT Left 5/24/2023    Procedure: Primary end-to-end repair of the left extensor hallucis longus tendon;  Surgeon: Prince Ruiz DPM;  Location: [facility] OR       Family History:    Family History   Problem Relation Age of Onset    Hypertension Father     Cancer Brother 42        muliple myeloma    Cancer Paternal Grandfather      Hypertension Paternal Grandfather        Social History:  Marital Status:   [2]  Social History     Tobacco Use    Smoking status: Never     Passive exposure: Never    Smokeless tobacco: Never   Vaping Use    Vaping status: Never Used   Substance Use Topics    Alcohol use: Not Currently     Comment: occ    Drug use: No        Medications:    lisdexamfetamine (VYVANSE) 10 MG capsule  lisdexamfetamine (VYVANSE) 20 MG capsule          Review of Systems  A medically appropriate review of systems was performed with pertinent positives and negatives noted in the HPI, and all other systems negative.    Physical Exam   Patient Vitals for the past 24 hrs:   BP Temp Temp src Pulse Resp SpO2   08/12/24 1620 -- -- -- 76 -- --   08/12/24 1615 109/78 -- -- -- -- --   08/12/24 1540 (!) 124/104 97.8  F (36.6  C) Tympanic 114 16 95 %          Physical Exam  Chest:      Chest wall: Tenderness present. No mass, lacerations, deformity, swelling, crepitus or edema. There is no dullness to percussion.          Comments: Pain reported in areas on anatomical form.  General: alert and in no acute distress on arrival  Head: atraumatic, normocephalic  Lungs:  nonlabored, CTA bilateral throughout.  CV: Regular rate and rhythm, extremities warm and perfused  Abd: nondistended, nontender, no rebound tenderness.  Skin: no rashes, no diaphoresis and skin color normal  Neuro: Patient awake, alert, speech is fluent, no focal deficits  Psychiatric: affect/mood normal, appropriate historian      ED Course                 Procedures                  EXAM: XR RIBS AND CHEST LEFT 3 VIEWS  LOCATION: [facility]  DATE: 8/12/2024     INDICATION: Injury has pain across lower left ribs front  COMPARISON: None.                                                                      IMPRESSION: There is no radiographic evidence of an acute displaced left-sided rib fracture with attention to left lower rib cage near the skin  marker.      No evidence of pneumonia or pulmonary edema. Cardiomediastinal silhouette and pulmonary vasculature are within normal limits. No pneumothorax or pleural effusion.     MEDICATIONS GIVEN IN THE EMERGENCY DEPARTMENT:  Medications - No data to display             Assessments & Plan (with Medical Decision Making)  43 year old male who presents to the Urgent Care for evaluation of pain over left ribs after falling today while waterskiing.  Denies any difficulty with breathing there are no obvious bruises present, no palpated hematomas on exam, no step-offs or rib abnormalities palpated.  X-rays ordered,  personally viewed x-rays, radiology interpretation reviewed, no fractures or bone abnormalities identified.  Reviewed this information with the patient recommended use of ice, heat stretching to stay mobile to prevent stiffness.  Diagnosis: Intercostal muscle strain, initial encounter  Recommended over-the-counter ibuprofen, ice, heat and stretching to prevent stiffness.  Reassurance provided that there were no fractures or bony abnormalities or abnormal bone alignment of ribs.       I have reviewed the nursing notes.    I have reviewed the findings, diagnosis, plan and need for follow up with the patient.        NEW PRESCRIPTIONS STARTED AT TODAY'S ER VISIT  New Prescriptions    No medications on file       Final diagnoses:   Intercostal muscle strain, initial encounter       8/12/2024   [facility] EMERGENCY DEPT       Tasia Rosario, APRN CNP  08/16/24 1029

## 2024-08-12 NOTE — DISCHARGE INSTRUCTIONS
Recommend icing for the next 2 days and then use of heat can help with staying mobile to prevent stiffness.  No fractures identified or bone abnormalities identified on x-rays today.

## 2025-05-20 ENCOUNTER — PATIENT OUTREACH (OUTPATIENT)
Dept: CARE COORDINATION | Facility: CLINIC | Age: 45
End: 2025-05-20
Payer: COMMERCIAL

## 2025-06-03 ENCOUNTER — PATIENT OUTREACH (OUTPATIENT)
Dept: CARE COORDINATION | Facility: CLINIC | Age: 45
End: 2025-06-03
Payer: COMMERCIAL

## 2025-08-02 ENCOUNTER — HEALTH MAINTENANCE LETTER (OUTPATIENT)
Age: 45
End: 2025-08-02

## (undated) DEVICE — GLOVE BIOGEL PI SZ 7.5 40875

## (undated) DEVICE — BAG CLEAR TRASH 1.3M 39X33" P4040C

## (undated) DEVICE — CAST PADDING 4" STERILE 9044S

## (undated) DEVICE — DRSG KERLIX FLUFFS X5

## (undated) DEVICE — BNDG KLING 4" 2236

## (undated) DEVICE — TRAY PREP DRY SKIN SCRUB 067

## (undated) DEVICE — SOL WATER IRRIG 1000ML BOTTLE 2F7114

## (undated) DEVICE — SU FIBERWIRE 2-0 TAPER CUT AR-7220

## (undated) DEVICE — SU VICRYL 3-0 PS-2 27" UND J427H

## (undated) DEVICE — SU ETHILON 4-0 PS-2 18" BLACK 1667H

## (undated) DEVICE — DECANTER VIAL 2006S

## (undated) DEVICE — Device

## (undated) DEVICE — LINEN TOWEL PACK X10 5473

## (undated) DEVICE — TOURNIQUET SGL  BLADDER 30"X4" BLUE 5921030135

## (undated) DEVICE — SUCTION CANISTER MEDIVAC LINER 3000ML W/LID 65651-530

## (undated) DEVICE — PACK LOWER EXTREMITY RIDGES

## (undated) DEVICE — GOWN IMPERVIOUS SPECIALTY XLG/XLONG 32474

## (undated) DEVICE — PREP CHLORAPREP 26ML TINTED HI-LITE ORANGE 930815

## (undated) DEVICE — SU ETHILON 3-0 PS-2 18" 1669H

## (undated) DEVICE — DRSG XEROFORM 5X9" 8884431605

## (undated) DEVICE — DRSG GAUZE 4X4" TRAY

## (undated) DEVICE — LINEN HALF SHEET 5512

## (undated) DEVICE — ESU GROUND PAD ADULT W/CORD E7507

## (undated) DEVICE — LINEN ORTHO ACL PACK 5447

## (undated) DEVICE — BLADE KNIFE SURG 15 371115

## (undated) DEVICE — LINEN FULL SHEET 5511

## (undated) DEVICE — SU FIBERWIRE 0 38" BLUE 22.2MM W/TAPER NDL  AR-7250

## (undated) DEVICE — TOURNIQUET SGL BLADDER 18"X4" RED 5921-218-135

## (undated) DEVICE — SU VICRYL 4-0 PS-2 18" UND J496H

## (undated) DEVICE — SYR 10ML FINGER CONTROL W/O NDL 309695

## (undated) RX ORDER — PROPOFOL 10 MG/ML
INJECTION, EMULSION INTRAVENOUS
Status: DISPENSED
Start: 2023-06-19

## (undated) RX ORDER — LIDOCAINE HYDROCHLORIDE 10 MG/ML
INJECTION, SOLUTION EPIDURAL; INFILTRATION; INTRACAUDAL; PERINEURAL
Status: DISPENSED
Start: 2023-05-24

## (undated) RX ORDER — BUPIVACAINE HYDROCHLORIDE 2.5 MG/ML
INJECTION, SOLUTION EPIDURAL; INFILTRATION; INTRACAUDAL
Status: DISPENSED
Start: 2023-05-24

## (undated) RX ORDER — PROPOFOL 10 MG/ML
INJECTION, EMULSION INTRAVENOUS
Status: DISPENSED
Start: 2023-05-24

## (undated) RX ORDER — GLYCOPYRROLATE 0.2 MG/ML
INJECTION INTRAMUSCULAR; INTRAVENOUS
Status: DISPENSED
Start: 2023-06-19

## (undated) RX ORDER — GLYCOPYRROLATE 0.2 MG/ML
INJECTION INTRAMUSCULAR; INTRAVENOUS
Status: DISPENSED
Start: 2023-05-24

## (undated) RX ORDER — FENTANYL CITRATE 50 UG/ML
INJECTION, SOLUTION INTRAMUSCULAR; INTRAVENOUS
Status: DISPENSED
Start: 2023-05-24

## (undated) RX ORDER — ONDANSETRON 2 MG/ML
INJECTION INTRAMUSCULAR; INTRAVENOUS
Status: DISPENSED
Start: 2023-05-24

## (undated) RX ORDER — CEFAZOLIN SODIUM/WATER 2 G/20 ML
SYRINGE (ML) INTRAVENOUS
Status: DISPENSED
Start: 2023-06-19

## (undated) RX ORDER — DEXAMETHASONE SODIUM PHOSPHATE 4 MG/ML
INJECTION, SOLUTION INTRA-ARTICULAR; INTRALESIONAL; INTRAMUSCULAR; INTRAVENOUS; SOFT TISSUE
Status: DISPENSED
Start: 2023-05-24

## (undated) RX ORDER — KETOROLAC TROMETHAMINE 30 MG/ML
INJECTION, SOLUTION INTRAMUSCULAR; INTRAVENOUS
Status: DISPENSED
Start: 2023-06-19

## (undated) RX ORDER — ONDANSETRON 2 MG/ML
INJECTION INTRAMUSCULAR; INTRAVENOUS
Status: DISPENSED
Start: 2023-06-19

## (undated) RX ORDER — BUPIVACAINE HYDROCHLORIDE 2.5 MG/ML
INJECTION, SOLUTION EPIDURAL; INFILTRATION; INTRACAUDAL
Status: DISPENSED
Start: 2023-06-19

## (undated) RX ORDER — FENTANYL CITRATE 50 UG/ML
INJECTION, SOLUTION INTRAMUSCULAR; INTRAVENOUS
Status: DISPENSED
Start: 2023-06-19